# Patient Record
Sex: FEMALE | Race: WHITE | NOT HISPANIC OR LATINO | Employment: FULL TIME | URBAN - METROPOLITAN AREA
[De-identification: names, ages, dates, MRNs, and addresses within clinical notes are randomized per-mention and may not be internally consistent; named-entity substitution may affect disease eponyms.]

---

## 2020-10-26 ENCOUNTER — TRANSFERRED RECORDS (OUTPATIENT)
Dept: HEALTH INFORMATION MANAGEMENT | Facility: CLINIC | Age: 60
End: 2020-10-26

## 2020-10-26 LAB
CHOLESTEROL (EXTERNAL): 205 MG/DL (ref 0–199)
CREATININE (EXTERNAL): 0.86 MG/DL (ref 0.55–1.02)
GFR ESTIMATED (EXTERNAL): >60 ML/MIN/1.73M2
GLUCOSE (EXTERNAL): 99 MG/DL (ref 70–100)
HBA1C MFR BLD: 5.5 %
HDLC SERPL-MCNC: 48 MG/DL
HPV ABSTRACT: NORMAL
LDL CHOLESTEROL CALCULATED (EXTERNAL): 125 MG/DL
PAP-ABSTRACT: NORMAL
POTASSIUM (EXTERNAL): 4.4 MMOL/L (ref 3.5–5.1)
TRIGLYCERIDES (EXTERNAL): 158 MG/DL

## 2021-10-25 ENCOUNTER — OFFICE VISIT (OUTPATIENT)
Dept: FAMILY MEDICINE | Facility: CLINIC | Age: 61
End: 2021-10-25
Payer: COMMERCIAL

## 2021-10-25 VITALS
HEART RATE: 63 BPM | SYSTOLIC BLOOD PRESSURE: 122 MMHG | TEMPERATURE: 98.1 F | DIASTOLIC BLOOD PRESSURE: 80 MMHG | OXYGEN SATURATION: 95 % | RESPIRATION RATE: 12 BRPM

## 2021-10-25 DIAGNOSIS — R05.9 COUGH: Primary | ICD-10-CM

## 2021-10-25 DIAGNOSIS — R09.81 CONGESTION OF PARANASAL SINUS: ICD-10-CM

## 2021-10-25 PROCEDURE — U0003 INFECTIOUS AGENT DETECTION BY NUCLEIC ACID (DNA OR RNA); SEVERE ACUTE RESPIRATORY SYNDROME CORONAVIRUS 2 (SARS-COV-2) (CORONAVIRUS DISEASE [COVID-19]), AMPLIFIED PROBE TECHNIQUE, MAKING USE OF HIGH THROUGHPUT TECHNOLOGIES AS DESCRIBED BY CMS-2020-01-R: HCPCS | Performed by: NURSE PRACTITIONER

## 2021-10-25 PROCEDURE — 99203 OFFICE O/P NEW LOW 30 MIN: CPT | Mod: 95 | Performed by: NURSE PRACTITIONER

## 2021-10-25 PROCEDURE — U0005 INFEC AGEN DETEC AMPLI PROBE: HCPCS | Performed by: NURSE PRACTITIONER

## 2021-10-25 RX ORDER — FLUOXETINE 20 MG/1
20 TABLET, FILM COATED ORAL DAILY
COMMUNITY
End: 2022-02-14

## 2021-10-25 RX ORDER — AZITHROMYCIN 250 MG/1
TABLET, FILM COATED ORAL
Qty: 6 TABLET | Refills: 0 | Status: SHIPPED | OUTPATIENT
Start: 2021-10-25 | End: 2021-10-30

## 2021-10-25 RX ORDER — ATENOLOL 25 MG/1
12.5 TABLET ORAL
COMMUNITY
Start: 2020-10-26 | End: 2022-02-14

## 2021-10-25 NOTE — PROGRESS NOTES
SUBJECTIVE:     Kareen Helton is a 60 year old female, here for a routine health maintenance visit.    Patient was roomed by: VALERIA Quezada CNP    HPI    {PEDS TEXT BY AGE:[id number]}

## 2021-10-25 NOTE — PATIENT INSTRUCTIONS
Thank you for choosing [facility].  You may be receiving an email and/or telephone survey request from [facility] [employer] regarding your visit today.  Please take a few minutes to respond to the survey to let us know how we are doing.      If you have questions or concerns, please contact us via [employer] or you can contact your care team at [phone] option [phone].    Our Clinic hours are:  Monday - Thursday 7am-6pm  Friday 7am-5pm    The [place] outpatient lab hours are:  Monday - Friday 7am-4:30pm    Appointments are required, call [phone]    If you have clinical questions after hours or would like to schedule an appointment,  call the clinic at [phone].

## 2021-10-25 NOTE — PROGRESS NOTES
Assessment & Plan     Cough  R/u covid   - Symptomatic COVID-19 Virus (Coronavirus) by PCR Nose  - azithromycin (ZITHROMAX) 250 MG tablet; Take 2 tablets (500 mg) by mouth daily for 1 day, THEN 1 tablet (250 mg) daily for 4 days.    Congestion of paranasal sinus  R/u covid- if negative will start antibiotics for sinusitis   - Symptomatic COVID-19 Virus (Coronavirus) by PCR Nose  - azithromycin (ZITHROMAX) 250 MG tablet; Take 2 tablets (500 mg) by mouth daily for 1 day, THEN 1 tablet (250 mg) daily for 4 days.      No follow-ups on file.    Chiquis Yan, VALERIA CNP  [facility] FIONA Mathur is a 60 year old who presents for the following health issues;     HPI     Acute Illness  Acute illness concerns: uri, symptoms started 9/18/21, was given pcn 9/30, did get better with meds, once off symptoms started again 10/11/21.  Symptoms started with draining nose/ sinus congestion- was given Amoxacillin from [facility] clinic- felt better. 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 No fevers. She feels fatigued. Low grade headache, + sore throat.   Had negative covid test 9/17/21; 9/20/21 , 9/22/21 ( due to travel to [place])   Onset/Duration: 10/11/21  Symptoms:  Fever: no  Chills/Sweats: no  Headache (location?): YES  Sinus Pressure: YES  Conjunctivitis:  YES  Ear Pain: YES: bilateral  Rhinorrhea: no  Congestion: YES  Sore Throat: YES  Cough: no  Wheeze: no  Decreased Appetite: no  Nausea: no  Vomiting: no  Diarrhea: no  Dysuria/Freq.: no  Dysuria or Hematuria: no  Fatigue/Achiness: YES  Sick/Strep Exposure: no  Therapies tried and outcome: pcn, ibuprofen, tylenol      Review of Systems   Constitutional, HEENT, cardiovascular, pulmonary, GI, , musculoskeletal, neuro, skin, endocrine and psych systems are negative, except as otherwise noted.      Objective    There were no vitals taken for  this visit.  There is no height or weight on file to calculate BMI.  Physical Exam   GENERAL: healthy, alert and no distress  MS: no gross musculoskeletal defects noted, no edema          Telephone visit was 10 minutes

## 2021-10-26 LAB — SARS-COV-2 RNA RESP QL NAA+PROBE: NEGATIVE

## 2021-12-12 ENCOUNTER — HEALTH MAINTENANCE LETTER (OUTPATIENT)
Age: 61
End: 2021-12-12

## 2022-02-01 ENCOUNTER — ANCILLARY PROCEDURE (OUTPATIENT)
Dept: GENERAL RADIOLOGY | Facility: CLINIC | Age: 62
End: 2022-02-01
Attending: NURSE PRACTITIONER
Payer: COMMERCIAL

## 2022-02-01 ENCOUNTER — OFFICE VISIT (OUTPATIENT)
Dept: FAMILY MEDICINE | Facility: CLINIC | Age: 62
End: 2022-02-01
Payer: COMMERCIAL

## 2022-02-01 VITALS
OXYGEN SATURATION: 97 % | WEIGHT: 192 LBS | BODY MASS INDEX: 34.02 KG/M2 | HEART RATE: 75 BPM | RESPIRATION RATE: 14 BRPM | TEMPERATURE: 97.9 F | HEIGHT: 63 IN | SYSTOLIC BLOOD PRESSURE: 128 MMHG | DIASTOLIC BLOOD PRESSURE: 88 MMHG

## 2022-02-01 DIAGNOSIS — R05.8 POST-VIRAL COUGH SYNDROME: ICD-10-CM

## 2022-02-01 DIAGNOSIS — R05.8 COUGH PRESENT FOR GREATER THAN 3 WEEKS: Primary | ICD-10-CM

## 2022-02-01 DIAGNOSIS — R05.8 COUGH PRESENT FOR GREATER THAN 3 WEEKS: ICD-10-CM

## 2022-02-01 PROCEDURE — 71046 X-RAY EXAM CHEST 2 VIEWS: CPT | Performed by: RADIOLOGY

## 2022-02-01 PROCEDURE — 99213 OFFICE O/P EST LOW 20 MIN: CPT | Performed by: NURSE PRACTITIONER

## 2022-02-01 RX ORDER — PREDNISONE 20 MG/1
TABLET ORAL
Qty: 13 TABLET | Refills: 0 | Status: SHIPPED | OUTPATIENT
Start: 2022-02-01 | End: 2022-02-11

## 2022-02-01 RX ORDER — VENLAFAXINE 37.5 MG/1
37.5 TABLET ORAL 2 TIMES DAILY
COMMUNITY
End: 2022-02-14

## 2022-02-01 RX ORDER — ALBUTEROL SULFATE 90 UG/1
1-2 AEROSOL, METERED RESPIRATORY (INHALATION) EVERY 6 HOURS PRN
Qty: 18 G | Refills: 0 | Status: SHIPPED | OUTPATIENT
Start: 2022-02-01 | End: 2023-01-11

## 2022-02-01 ASSESSMENT — MIFFLIN-ST. JEOR: SCORE: 1407.1

## 2022-02-01 NOTE — PATIENT INSTRUCTIONS
Patient Education     Chronic Cough with Uncertain Cause (Adult)  Everyone has had a cough as part of the common cold, flu, or bronchitis. This kind of cough occurs along with an achy feeling, low-grade fever, nasal and sinus congestion, and a scratchy or sore throat. This usually gets better in 2 to 3 weeks. A cough that lasts longer than 3 weeks may be due to other causes. Your healthcare provider may refer to this as a chronic cough.   If your cough does not improve over the next 2 weeks, further testing may be needed. Follow up with your healthcare provider as advised. Cough suppressants may be recommended. Based on your exam today, the exact cause of your cough is not certain. Below are some common causes for persistent cough.   Smoker's cough  Smoker s cough doesn t go away. If you continue to smoke, it only gets worse. The cough is from irritation in the air passages. Talk to your healthcare provider about quitting. Medicines or nicotine-replacement products, like gum or the patch, may make quitting easier.   Postnasal drip  A cough that is worse at night may be due to postnasal drip. Excess mucus in the nose drains from the back of your nose to your throat. This triggers the cough reflex. Postnasal drip may be due to a sinus infection or allergy. Common allergens include dust, tobacco smoke (both inhaled and secondhand smoke), environmental pollutants, pollen, mold, pets, cleaning agents, room deodorizers, and chemical fumes. Over-the-counter antihistamines or decongestants may be helpful for allergies. A sinus infection may requires antibiotic treatment. See your healthcare provider if symptoms continue.     Medicines  Certain prescribed medicines can cause a chronic cough in some people:    ACE inhibitors for high blood pressure. These include benazepril, captopril, enalapril, fosinopril, lisinopril, quinapril, ramipril, and others.    Beta-blockers for high blood pressure and other conditions. These  include propranolol, atenolol, metoprolol, nadolol, and others.  Let your healthcare provider know if you are taking any of these. The chronic cough may mean your medicine needs to be changed.   Asthma  Cough may be the only sign of mild asthma. You may have tests to find out if asthma is causing your cough. You may also take asthma medicine on a trial basis.   Acid reflux (heartburn, GERD)  The esophagus is the tube that carries food from the mouth to the stomach. A valve at its lower end prevents stomach acids from flowing upward. If this valve does not work properly, acid from the stomach enters the esophagus. This may cause a burning pain in the upper abdomen or lower chest, belching, or cough. Symptoms are often worse when lying flat. Avoid eating or drinking before bedtime. Try using extra pillows to raise your upper body, or place 4-inch blocks under the head of your bed. You may try an over-the-counter (OTC) antacid or an acid-blocking medicine such as famotidine, cimetidine, esomeprazole, lansoprazole, or omeprazole. Stronger medicines for this condition can be prescribed by your healthcare provider. Ask your healthcare provider which OTC medicine to use. Depending on your current medicines, some OTC medicines may cause drug interactions and should be avoided.   Follow-up care  Follow up with your healthcare provider, or as advised, if your cough does not improve. Further testing may be needed.   Note: If an X-ray was taken, a specialist will review it. You will be notified of any new findings that may affect your care.   When to seek medical advice  Call your healthcare provider right away if any of these occur:    Mild wheezing or difficulty breathing    Fever of 100.4 F (38 C) or higher, or as directed by your healthcare provider    Unexpected weight loss    Coughing up large amounts of colored sputum or blood-tinged sputum    Night sweats (sheets and pajamas get soaking wet)  Call 911  Call 911 if any  of these occur:     Coughing up blood    Moderate to severe trouble breathing or wheezing  [employer] last reviewed this educational content on 6/1/2018 2000-2021 [employer]. All rights reserved. This information is not intended as a substitute for professional medical care. Always follow your healthcare professional's instructions.

## 2022-02-01 NOTE — PROGRESS NOTES
"  Assessment & Plan     Cough present for greater than 3 weeks  The risks, benefits and treatment options of prescribed medications or other treatments have been discussed with the patient. The patient verbalized their understanding and should call or follow up if no improvement or if they develop further problems.    - predniSONE (DELTASONE) 20 MG tablet  Dispense: 13 tablet; Refill: 0  - albuterol (PROAIR HFA/PROVENTIL HFA/VENTOLIN HFA) 108 (90 Base) MCG/ACT inhaler  Dispense: 18 g; Refill: 0  - XR Chest 2 Views    Post-viral cough syndrome   Discussed this and use of Albuterol and Prednisone for symptoms management.    - predniSONE (DELTASONE) 20 MG tablet  Dispense: 13 tablet; Refill: 0  - albuterol (PROAIR HFA/PROVENTIL HFA/VENTOLIN HFA) 108 (90 Base) MCG/ACT inhaler  Dispense: 18 g; Refill: 0  - XR Chest 2 Views          BMI:   Estimated body mass index is 34.56 kg/m  as calculated from the following:    Height as of this encounter: 1.588 m (5' 2.5\").    Weight as of this encounter: 87.1 kg (192 lb).   Weight management plan: Patient was referred to their PCP to discuss a diet and exercise plan.    Patient Instructions     Patient Education     Chronic Cough with Uncertain Cause (Adult)  Everyone has had a cough as part of the common cold, flu, or bronchitis. This kind of cough occurs along with an achy feeling, low-grade fever, nasal and sinus congestion, and a scratchy or sore throat. This usually gets better in 2 to 3 weeks. A cough that lasts longer than 3 weeks may be due to other causes. Your healthcare provider may refer to this as a chronic cough.   If your cough does not improve over the next 2 weeks, further testing may be needed. Follow up with your healthcare provider as advised. Cough suppressants may be recommended. Based on your exam today, the exact cause of your cough is not certain. Below are some common causes for persistent cough.   Smoker's cough  Smoker s cough doesn t go away. If you " continue to smoke, it only gets worse. The cough is from irritation in the air passages. Talk to your healthcare provider about quitting. Medicines or nicotine-replacement products, like gum or the patch, may make quitting easier.   Postnasal drip  A cough that is worse at night may be due to postnasal drip. Excess mucus in the nose drains from the back of your nose to your throat. This triggers the cough reflex. Postnasal drip may be due to a sinus infection or allergy. Common allergens include dust, tobacco smoke (both inhaled and secondhand smoke), environmental pollutants, pollen, mold, pets, cleaning agents, room deodorizers, and chemical fumes. Over-the-counter antihistamines or decongestants may be helpful for allergies. A sinus infection may requires antibiotic treatment. See your healthcare provider if symptoms continue.     Medicines  Certain prescribed medicines can cause a chronic cough in some people:    ACE inhibitors for high blood pressure. These include benazepril, captopril, enalapril, fosinopril, lisinopril, quinapril, ramipril, and others.    Beta-blockers for high blood pressure and other conditions. These include propranolol, atenolol, metoprolol, nadolol, and others.  Let your healthcare provider know if you are taking any of these. The chronic cough may mean your medicine needs to be changed.   Asthma  Cough may be the only sign of mild asthma. You may have tests to find out if asthma is causing your cough. You may also take asthma medicine on a trial basis.   Acid reflux (heartburn, GERD)  The esophagus is the tube that carries food from the mouth to the stomach. A valve at its lower end prevents stomach acids from flowing upward. If this valve does not work properly, acid from the stomach enters the esophagus. This may cause a burning pain in the upper abdomen or lower chest, belching, or cough. Symptoms are often worse when lying flat. Avoid eating or drinking before bedtime. Try using  extra pillows to raise your upper body, or place 4-inch blocks under the head of your bed. You may try an over-the-counter (OTC) antacid or an acid-blocking medicine such as famotidine, cimetidine, esomeprazole, lansoprazole, or omeprazole. Stronger medicines for this condition can be prescribed by your healthcare provider. Ask your healthcare provider which OTC medicine to use. Depending on your current medicines, some OTC medicines may cause drug interactions and should be avoided.   Follow-up care  Follow up with your healthcare provider, or as advised, if your cough does not improve. Further testing may be needed.   Note: If an X-ray was taken, a specialist will review it. You will be notified of any new findings that may affect your care.   When to seek medical advice  Call your healthcare provider right away if any of these occur:    Mild wheezing or difficulty breathing    Fever of 100.4 F (38 C) or higher, or as directed by your healthcare provider    Unexpected weight loss    Coughing up large amounts of colored sputum or blood-tinged sputum    Night sweats (sheets and pajamas get soaking wet)  Call 911  Call 911 if any of these occur:     Coughing up blood    Moderate to severe trouble breathing or wheezing  [employer] last reviewed this educational content on 6/1/2018 2000-2021 [employer]. All rights reserved. This information is not intended as a substitute for professional medical care. Always follow your healthcare professional's instructions.               Return in about 4 weeks (around 3/1/2022) for Recheck symptoms.    Beckie Wells NP  [facility]    Palmer Mathur is a 61 year old who presents for the following health issues     HPI     Acute Illness  Acute illness concerns: wheezing, was sick in September 2021  Onset/Duration: September was sick with respiratory illness (not COVID) - went on antibiotics after being seen in [facility] clinic helped  "some.  Seen in clinic here in Oct - given Azithromycin and this helped but didn't fully resolve symptoms.    Positive covid 01/24 - illness started 01/16/21 - negative covid 01/26/21  Symptoms:  Fever: no  Chills/Sweats: no  Headache (location?): no  Sinus Pressure: no  Conjunctivitis:  no  Ear Pain: no  Rhinorrhea: no  Congestion: no  Sore Throat: no  Cough: YES-non-productive  Wheeze: YES  Decreased Appetite: no  Nausea: no  Vomiting: no  Diarrhea: no  Dysuria/Freq.: no  Dysuria or Hematuria: no  Fatigue/Achiness: YES  Sick/Strep Exposure: no  Therapies tried and outcome: Antibiotics in Sept and Oct    Denies fevers/chills.  Reports some night sweats with most recent illness.  Some shortness of breath with exertion and activity more recently.    No history of asthma or lung disease.    Not a smoker.    Review of Systems   Constitutional, HEENT, cardiovascular, pulmonary, GI, , musculoskeletal, neuro, skin, endocrine and psych systems are negative, except as otherwise noted.      Objective    /88 (BP Location: Right arm, Patient Position: Sitting, Cuff Size: Adult Large)   Pulse 75   Temp 97.9  F (36.6  C) (Tympanic)   Resp 14   Ht 1.588 m (5' 2.5\")   Wt 87.1 kg (192 lb)   SpO2 97%   Breastfeeding No   BMI 34.56 kg/m    Body mass index is 34.56 kg/m .  Physical Exam   GENERAL: healthy, alert and no distress  NECK: no adenopathy, no asymmetry, masses, or scars and thyroid normal to palpation  RESP: no rales , no rhonchi and expiratory wheezes bibasilar  CV: regular rate and rhythm, normal S1 S2, no S3 or S4, no murmur, click or rub, no peripheral edema and peripheral pulses strong  ABDOMEN: soft, nontender, no hepatosplenomegaly, no masses and bowel sounds normal  MS: no gross musculoskeletal defects noted, no edema      X-ray ordered and independantly visualized in the office today.  Findings significant for: negative.  Official radiology interpretation pending      "

## 2022-02-14 ENCOUNTER — OFFICE VISIT (OUTPATIENT)
Dept: FAMILY MEDICINE | Facility: CLINIC | Age: 62
End: 2022-02-14
Payer: COMMERCIAL

## 2022-02-14 VITALS
WEIGHT: 194.8 LBS | BODY MASS INDEX: 34.52 KG/M2 | DIASTOLIC BLOOD PRESSURE: 82 MMHG | RESPIRATION RATE: 12 BRPM | TEMPERATURE: 98.2 F | HEART RATE: 78 BPM | OXYGEN SATURATION: 93 % | HEIGHT: 63 IN | SYSTOLIC BLOOD PRESSURE: 138 MMHG

## 2022-02-14 DIAGNOSIS — F33.42 RECURRENT MAJOR DEPRESSION IN COMPLETE REMISSION (H): ICD-10-CM

## 2022-02-14 DIAGNOSIS — Z11.4 SCREENING FOR HIV (HUMAN IMMUNODEFICIENCY VIRUS): ICD-10-CM

## 2022-02-14 DIAGNOSIS — Z12.31 ENCOUNTER FOR SCREENING MAMMOGRAM FOR BREAST CANCER: ICD-10-CM

## 2022-02-14 DIAGNOSIS — I10 ESSENTIAL HYPERTENSION: ICD-10-CM

## 2022-02-14 DIAGNOSIS — Z11.59 NEED FOR HEPATITIS C SCREENING TEST: ICD-10-CM

## 2022-02-14 DIAGNOSIS — Z00.00 ROUTINE GENERAL MEDICAL EXAMINATION AT A HEALTH CARE FACILITY: Primary | ICD-10-CM

## 2022-02-14 LAB
ANION GAP SERPL CALCULATED.3IONS-SCNC: 1 MMOL/L (ref 3–14)
BUN SERPL-MCNC: 13 MG/DL (ref 7–30)
CALCIUM SERPL-MCNC: 9.2 MG/DL (ref 8.5–10.1)
CHLORIDE BLD-SCNC: 104 MMOL/L (ref 94–109)
CO2 SERPL-SCNC: 33 MMOL/L (ref 20–32)
CREAT SERPL-MCNC: 0.85 MG/DL (ref 0.52–1.04)
GFR SERPL CREATININE-BSD FRML MDRD: 78 ML/MIN/1.73M2
GLUCOSE BLD-MCNC: 100 MG/DL (ref 70–99)
HCV AB SERPL QL IA: NONREACTIVE
HIV 1+2 AB+HIV1 P24 AG SERPL QL IA: NONREACTIVE
POTASSIUM BLD-SCNC: 4.1 MMOL/L (ref 3.4–5.3)
SODIUM SERPL-SCNC: 138 MMOL/L (ref 133–144)

## 2022-02-14 PROCEDURE — 80048 BASIC METABOLIC PNL TOTAL CA: CPT | Performed by: NURSE PRACTITIONER

## 2022-02-14 PROCEDURE — 36415 COLL VENOUS BLD VENIPUNCTURE: CPT | Performed by: NURSE PRACTITIONER

## 2022-02-14 PROCEDURE — 99214 OFFICE O/P EST MOD 30 MIN: CPT | Mod: 25 | Performed by: NURSE PRACTITIONER

## 2022-02-14 PROCEDURE — 99396 PREV VISIT EST AGE 40-64: CPT | Mod: 25 | Performed by: NURSE PRACTITIONER

## 2022-02-14 PROCEDURE — 90714 TD VACC NO PRESV 7 YRS+ IM: CPT | Performed by: NURSE PRACTITIONER

## 2022-02-14 PROCEDURE — 87389 HIV-1 AG W/HIV-1&-2 AB AG IA: CPT | Performed by: NURSE PRACTITIONER

## 2022-02-14 PROCEDURE — 90471 IMMUNIZATION ADMIN: CPT | Performed by: NURSE PRACTITIONER

## 2022-02-14 PROCEDURE — 86803 HEPATITIS C AB TEST: CPT | Performed by: NURSE PRACTITIONER

## 2022-02-14 RX ORDER — ATENOLOL 25 MG/1
12.5 TABLET ORAL DAILY
Qty: 45 TABLET | Refills: 3 | Status: SHIPPED | OUTPATIENT
Start: 2022-02-14 | End: 2023-01-27

## 2022-02-14 RX ORDER — VENLAFAXINE 37.5 MG/1
37.5 TABLET ORAL DAILY
Qty: 90 TABLET | Refills: 3 | Status: SHIPPED | OUTPATIENT
Start: 2022-02-14 | End: 2023-01-27

## 2022-02-14 ASSESSMENT — ENCOUNTER SYMPTOMS
HEMATOCHEZIA: 0
DIZZINESS: 0
NERVOUS/ANXIOUS: 0
DIARRHEA: 0
COUGH: 0
PARESTHESIAS: 0
HEADACHES: 0
MYALGIAS: 0
EYE PAIN: 0
PALPITATIONS: 0
WEAKNESS: 0
ARTHRALGIAS: 0
CHILLS: 0
NAUSEA: 0
FREQUENCY: 0
DYSURIA: 0
HEARTBURN: 0
FEVER: 0
JOINT SWELLING: 0
BREAST MASS: 0
SORE THROAT: 0
HEMATURIA: 0
SHORTNESS OF BREATH: 0
ABDOMINAL PAIN: 0
CONSTIPATION: 0

## 2022-02-14 ASSESSMENT — PATIENT HEALTH QUESTIONNAIRE - PHQ9: SUM OF ALL RESPONSES TO PHQ QUESTIONS 1-9: 0

## 2022-02-14 ASSESSMENT — MIFFLIN-ST. JEOR: SCORE: 1419.8

## 2022-02-14 NOTE — LETTER
February 14, 2022      Kareen Helton  [address] [address] [place] [address]        Dear ,    We are writing to inform you of your test results.    Your labs are normal..       Resulted Orders   Basic metabolic panel  (Ca, Cl, CO2, Creat, Gluc, K, Na, BUN)   Result Value Ref Range    Sodium 138 133 - 144 mmol/L    Potassium 4.1 3.4 - 5.3 mmol/L    Chloride 104 94 - 109 mmol/L    Carbon Dioxide (CO2) 33 (H) 20 - 32 mmol/L    Anion Gap 1 (L) 3 - 14 mmol/L    Urea Nitrogen 13 7 - 30 mg/dL    Creatinine 0.85 0.52 - 1.04 mg/dL    Calcium 9.2 8.5 - 10.1 mg/dL    Glucose 100 (H) 70 - 99 mg/dL    GFR Estimate 78 >60 mL/min/1.73m2      Comment:      Effective December 21, 2021 eGFRcr in adults is calculated using the 2021 CKD-EPI creatinine equation which includes age and gender (Jeff et al., NEJM, DOI: 10.1056/SADPtf5740631)       If you have any questions or concerns, please call the clinic at the number listed above.       Sincerely,      VALERIA Quezada CNP

## 2022-02-14 NOTE — PROGRESS NOTES
SUBJECTIVE:   CC: Kareen Helton is an 61 year old woman who presents for preventive health visit.       Patient has been advised of split billing requirements and indicates understanding: Yes  Healthy Habits:     Getting at least 3 servings of Calcium per day:  Yes    Bi-annual eye exam:  Yes    Dental care twice a year:  Yes    Sleep apnea or symptoms of sleep apnea:  None    Diet:  Regular (no restrictions)    Frequency of exercise:  None    Taking medications regularly:  Yes    Medication side effects:  None    PHQ-2 Total Score: 0    Additional concerns today:  No  Imm/Inj  Pertinent negatives include no abdominal pain, arthralgias, chest pain, chills, congestion, coughing, fever, headaches, joint swelling, myalgias, nausea, rash, sore throat or weakness.           Hypertension Follow-up      Do you check your blood pressure regularly outside of the clinic? No     Are you following a low salt diet? No    Are your blood pressures ever more than 140 on the top number (systolic) OR more   than 90 on the bottom number (diastolic), for example 140/90? No   BP Readings from Last 3 Encounters:   02/14/22 138/82   02/01/22 128/88   10/25/21 122/80       Depression Followup    How are you doing with your depression since your last visit? Improved ALOT    Are you having other symptoms that might be associated with depression? No    Have you had a significant life event?  No     Are you feeling anxious or having panic attacks?   No    Do you have any concerns with your use of alcohol or other drugs? No    Social History     Tobacco Use     Smoking status: Former Smoker     Types: Cigarettes     Smokeless tobacco: Never Used   Vaping Use     Vaping Use: Never used   Substance Use Topics     Alcohol use: Yes     Comment: 5 per week     Drug use: Never     PHQ 2/14/2022   PHQ-9 Total Score 0   Q9: Thoughts of better off dead/self-harm past 2 weeks Not at all     No flowsheet data found.  Last PHQ-9 2/14/2022   1.  Little  interest or pleasure in doing things 0   2.  Feeling down, depressed, or hopeless 0   3.  Trouble falling or staying asleep, or sleeping too much 0   4.  Feeling tired or having little energy 0   5.  Poor appetite or overeating 0   6.  Feeling bad about yourself 0   7.  Trouble concentrating 0   8.  Moving slowly or restless 0   Q9: Thoughts of better off dead/self-harm past 2 weeks 0   PHQ-9 Total Score 0   Difficulty at work, home, or with people Not difficult at all       Suicide Assessment Five-step Evaluation and Treatment (SAFE-T)      Today's PHQ-2 Score:   PHQ-2 ( 1999 [employer]izer) 2/14/2022   Q1: Little interest or pleasure in doing things 0   Q2: Feeling down, depressed or hopeless 0   PHQ-2 Score 0   Q1: Little interest or pleasure in doing things Not at all   Q2: Feeling down, depressed or hopeless Not at all   PHQ-2 Score 0       Abuse: Current or Past (Physical, Sexual or Emotional) - No  Do you feel safe in your environment? Yes    Have you ever done Advance Care Planning? (For example, a Health Directive, POLST, or a discussion with a medical provider or your loved ones about your wishes): Yes, patient states has an Advance Care Planning document and will bring a copy to the clinic.    Social History     Tobacco Use     Smoking status: Former Smoker     Types: Cigarettes     Smokeless tobacco: Never Used   Substance Use Topics     Alcohol use: Yes     Comment: 5 per week     If you drink alcohol do you typically have >3 drinks per day or >7 drinks per week? No    Alcohol Use 2/14/2022   Prescreen: >3 drinks/day or >7 drinks/week? No   Prescreen: >3 drinks/day or >7 drinks/week? -   No flowsheet data found.    Reviewed orders with patient.  Reviewed health maintenance and updated orders accordingly - Yes  BP Readings from Last 3 Encounters:   02/14/22 138/82   02/01/22 128/88   10/25/21 122/80    Wt Readings from Last 3 Encounters:   02/14/22 88.4 kg (194 lb 12.8 oz)   02/01/22 87.1 kg (192 lb)                     Breast Cancer Screening:    FHS-7:   Breast CA Risk Assessment (FHS-7) 2/14/2022   Did any of your first-degree relatives have breast or ovarian cancer? No   Did any of your relatives have bilateral breast cancer? No   Did any man in your family have breast cancer? No   Did any woman in your family have breast and ovarian cancer? No   Did any woman in your family have breast cancer before age 50 y? No   Do you have 2 or more relatives with breast and/or ovarian cancer? No   Do you have 2 or more relatives with breast and/or bowel cancer? No     click delete button to remove this line now  Mammogram Screening: Recommended mammography every 1-2 years with patient discussion and risk factor consideration  Pertinent mammograms are reviewed under the imaging tab.    History of abnormal Pap smear: Status post benign hysterectomy. Health Maintenance and Surgical History updated.     Reviewed and updated as needed this visit by clinical staff  Tobacco  Allergies  Meds   Med Hx  Surg Hx  Fam Hx  Soc Hx       Reviewed and updated as needed this visit by Provider                   Review of Systems   Constitutional: Negative for chills and fever.   HENT: Negative for congestion, ear pain, hearing loss and sore throat.    Eyes: Negative for pain and visual disturbance.   Respiratory: Negative for cough and shortness of breath.    Cardiovascular: Negative for chest pain, palpitations and peripheral edema.   Gastrointestinal: Negative for abdominal pain, constipation, diarrhea, heartburn, hematochezia and nausea.   Breasts:  Negative for tenderness, breast mass and discharge.   Genitourinary: Negative for dysuria, frequency, genital sores, hematuria, pelvic pain, urgency, vaginal bleeding and vaginal discharge.   Musculoskeletal: Negative for arthralgias, joint swelling and myalgias.   Skin: Negative for rash.   Neurological: Negative for dizziness, weakness, headaches and paresthesias.   Psychiatric/Behavioral:  "Negative for mood changes. The patient is not nervous/anxious.      CONSTITUTIONAL: NEGATIVE for fever, chills, change in weight  INTEGUMENTARY/SKIN: NEGATIVE for worrisome rashes, moles or lesions  EYES: NEGATIVE for vision changes or irritation  ENT: NEGATIVE for ear, mouth and throat problems  RESP: NEGATIVE for significant cough or SOB  BREAST: patient declined  CV: NEGATIVE for chest pain, palpitations or peripheral edema  GI: NEGATIVE for nausea, abdominal pain, heartburn, or change in bowel habits  : NEGATIVE for unusual urinary or vaginal symptoms. No vaginal bleeding.  MUSCULOSKELETAL: NEGATIVE for significant arthralgias or myalgia  NEURO: NEGATIVE for weakness, dizziness or paresthesias  PSYCHIATRIC: NEGATIVE for changes in mood or affect      OBJECTIVE:   /82 (BP Location: Left arm, Patient Position: Chair, Cuff Size: Adult Regular)   Pulse 78   Temp 98.2  F (36.8  C) (Tympanic)   Resp 12   Ht 1.588 m (5' 2.5\")   Wt 88.4 kg (194 lb 12.8 oz)   SpO2 93%   BMI 35.06 kg/m    Physical Exam  GENERAL APPEARANCE: healthy, alert and no distress  EYES: Eyes grossly normal to inspection, PERRL and conjunctivae and sclerae normal  HENT: ear canals and TM's normal, nose and mouth without ulcers or lesions, oropharynx clear and oral mucous membranes moist  NECK: no adenopathy, no asymmetry, masses, or scars and thyroid normal to palpation  RESP: lungs clear to auscultation - no rales, rhonchi or wheezes  BREAST: patient declined  CV: regular rate and rhythm, normal S1 S2, no S3 or S4, no murmur, click or rub, no peripheral edema and peripheral pulses strong  ABDOMEN: soft, nontender, no hepatosplenomegaly, no masses and bowel sounds normal  MS: no musculoskeletal defects are noted and gait is age appropriate without ataxia  SKIN: no suspicious lesions or rashes  NEURO: Normal strength and tone, sensory exam grossly normal, mentation intact and speech normal  PSYCH: mentation appears normal and affect " "normal/bright    Diagnostic Test Results:  Labs reviewed in Epic    ASSESSMENT/PLAN:   (Z00.00) Routine general medical examination at a health care facility  (primary encounter diagnosis)  Comment:   Plan:     (Z11.4) Screening for HIV (human immunodeficiency virus)  Comment:   Plan: HIV Antigen Antibody Combo            (Z11.59) Need for hepatitis C screening test  Comment:   Plan: Hepatitis C Screen Reflex to HCV RNA Quant and         Genotype            (I10) Essential hypertension  Comment: controlled  Plan:Refilled atenolol (TENORMIN) 25 MG tablet, Basic         metabolic panel  (Ca, Cl, CO2, Creat, Gluc, K,         Na, BUN)            (F33.42) Recurrent major depression in complete remission (H)  Comment: well controlled  Plan: Refilled venlafaxine (EFFEXOR) 37.5 MG tablet            (Z12.31) Encounter for screening mammogram for breast cancer  Comment:   Plan: *MA Screening Digital Bilateral              Patient has been advised of split billing requirements and indicates understanding: Yes    COUNSELING:  Reviewed preventive health counseling, as reflected in patient instructions       Regular exercise       Healthy diet/nutrition       Osteoporosis prevention/bone health       Colorectal Cancer Screening       Consider Hep C screening for all patients one time for ages 18-79 years       HIV screeninx in teen years, 1x in adult years, and at intervals if high risk    Estimated body mass index is 35.06 kg/m  as calculated from the following:    Height as of this encounter: 1.588 m (5' 2.5\").    Weight as of this encounter: 88.4 kg (194 lb 12.8 oz).    Weight management plan: Discussed healthy diet and exercise guidelines    She reports that she has quit smoking. Her smoking use included cigarettes. She has never used smokeless tobacco.      Counseling Resources:  ATP IV Guidelines  Pooled Cohorts Equation Calculator  Breast Cancer Risk Calculator  BRCA-Related Cancer Risk Assessment: FHS-7 Tool  FRAX " Risk Assessment  ICSI Preventive Guidelines  Dietary Guidelines for Americans, 2010  USDA's MyPlate  ASA Prophylaxis  Lung CA Screening    VALERIA Quezada CNP  [facility]

## 2022-02-14 NOTE — PATIENT INSTRUCTIONS
Thank you for choosing [facility].  You may be receiving an email and/or telephone survey request from [facility] Customer Experience regarding your visit today.  Please take a few minutes to respond to the survey to let us know how we are doing.      If you have questions or concerns, please contact us via [employer] or you can contact your care team at [phone] option [phone].    Our Clinic hours are:  Monday - Thursday 7am-6pm  Friday 7am-5pm    The [place] outpatient lab hours are:  Monday - Friday 7am-4:30pm    Appointments are required, call [phone]    If you have clinical questions after hours or would like to schedule an appointment,  call the clinic at [phone].

## 2022-07-25 ENCOUNTER — ANCILLARY PROCEDURE (OUTPATIENT)
Dept: MAMMOGRAPHY | Facility: CLINIC | Age: 62
End: 2022-07-25
Attending: NURSE PRACTITIONER
Payer: COMMERCIAL

## 2022-07-25 ENCOUNTER — TELEPHONE (OUTPATIENT)
Dept: FAMILY MEDICINE | Facility: CLINIC | Age: 62
End: 2022-07-25

## 2022-07-25 ENCOUNTER — MYC MEDICAL ADVICE (OUTPATIENT)
Dept: FAMILY MEDICINE | Facility: CLINIC | Age: 62
End: 2022-07-25

## 2022-07-25 DIAGNOSIS — Z12.31 ENCOUNTER FOR SCREENING MAMMOGRAM FOR BREAST CANCER: ICD-10-CM

## 2022-07-25 PROCEDURE — 77063 BREAST TOMOSYNTHESIS BI: CPT | Mod: TC | Performed by: RADIOLOGY

## 2022-07-25 PROCEDURE — 77067 SCR MAMMO BI INCL CAD: CPT | Mod: TC | Performed by: RADIOLOGY

## 2022-07-25 ASSESSMENT — ANXIETY QUESTIONNAIRES
5. BEING SO RESTLESS THAT IT IS HARD TO SIT STILL: NOT AT ALL
GAD7 TOTAL SCORE: 0
2. NOT BEING ABLE TO STOP OR CONTROL WORRYING: NOT AT ALL
7. FEELING AFRAID AS IF SOMETHING AWFUL MIGHT HAPPEN: NOT AT ALL
GAD7 TOTAL SCORE: 0
3. WORRYING TOO MUCH ABOUT DIFFERENT THINGS: NOT AT ALL
4. TROUBLE RELAXING: NOT AT ALL
7. FEELING AFRAID AS IF SOMETHING AWFUL MIGHT HAPPEN: NOT AT ALL
6. BECOMING EASILY ANNOYED OR IRRITABLE: NOT AT ALL
GAD7 TOTAL SCORE: 0
1. FEELING NERVOUS, ANXIOUS, OR ON EDGE: NOT AT ALL

## 2022-07-25 ASSESSMENT — PATIENT HEALTH QUESTIONNAIRE - PHQ9
SUM OF ALL RESPONSES TO PHQ QUESTIONS 1-9: 0
SUM OF ALL RESPONSES TO PHQ QUESTIONS 1-9: 0

## 2022-07-25 NOTE — TELEPHONE ENCOUNTER
Patient Quality Outreach    Patient is due for the following:   Colon Cancer Screening -  FIT, Colonoscopy, and Cologuard  Depression  -  PHQ-9 Needed    NEXT STEPS:   No follow up needed at this time.    Type of outreach:    Sent [employer] message.      Questions for provider review:    None     PHILLIP Burgos LPN

## 2022-07-25 NOTE — TELEPHONE ENCOUNTER
Patient Quality Outreach    Patient is due for the following:   Depression  -  PHQ-9 Needed    NEXT STEPS:   phq/gad7    Type of outreach:    Sent [facility] message.      Questions for provider review:    Patient completed phq/gad7 sent back thru my chart. Scores for both were 0. Updated chart.      PHQ-9 ([employer]) 7/25/2022   1.  Little interest or pleasure in doing things 0   2.  Feeling down, depressed, or hopeless 0   3.  Trouble falling or staying asleep, or sleeping too much 0   4.  Feeling tired or having little energy 0   5.  Poor appetite or overeating 0   6.  Feeling bad about yourself 0   7.  Trouble concentrating 0   8.  Moving slowly or restless 0   9.  Suicidal or self-harm thoughts 0   PHQ-9 Total Score 0   Difficulty at work, home, or with people    1.  Little interest or pleasure in doing things Not at all   2.  Feeling down, depressed, or hopeless Not at all   3.  Trouble falling or staying asleep, or sleeping too much Not at all   4.  Feeling tired or having little energy Not at all   5.  Poor appetite or overeating Not at all   6.  Feeling bad about yourself Not at all   7.  Trouble concentrating Not at all   8.  Moving slowly or restless Not at all   9.  Suicidal or self-harm thoughts Not at all   PHQ-9 via [facility] TOTAL SCORE-----> 0     DEREK-7   [employer], 2002; Used with Permission) 7/25/2022   1. Feeling nervous, anxious, or on edge Not at all   2. Not being able to stop or control worrying Not at all   3. Worrying too much about different things Not at all   4. Trouble relaxing Not at all   5. Being so restless that it is hard to sit still Not at all   6. Becoming easily annoyed or irritable Not at all   7. Feeling afraid, as if something awful might happen Not at all   DEREK 7 TOTAL SCORE 0 (minimal anxiety)   1. Feeling nervous, anxious, or on edge 0   2. Not being able to stop or control worrying 0   3. Worrying too much about different things 0   4. Trouble relaxing 0   5. Being so restless  that it is hard to sit still 0   6. Becoming easily annoyed or irritable 0   7. Feeling afraid, as if something awful might happen 0   DEREK-7 Total Score 0     Agustin Thao, CMA

## 2022-10-03 ENCOUNTER — HEALTH MAINTENANCE LETTER (OUTPATIENT)
Age: 62
End: 2022-10-03

## 2022-10-31 ENCOUNTER — HOSPITAL ENCOUNTER (EMERGENCY)
Facility: CLINIC | Age: 62
Discharge: HOME | End: 2022-10-31
Attending: EMERGENCY MEDICINE | Admitting: EMERGENCY MEDICINE
Payer: COMMERCIAL

## 2022-10-31 VITALS
TEMPERATURE: 98 F | BODY MASS INDEX: 33.13 KG/M2 | HEART RATE: 84 BPM | SYSTOLIC BLOOD PRESSURE: 153 MMHG | HEIGHT: 62 IN | OXYGEN SATURATION: 95 % | RESPIRATION RATE: 18 BRPM | WEIGHT: 180 LBS | DIASTOLIC BLOOD PRESSURE: 93 MMHG

## 2022-10-31 DIAGNOSIS — R42 VERTIGO: ICD-10-CM

## 2022-10-31 PROCEDURE — 250N000012 HC RX MED GY IP 250 OP 636 PS 637: Performed by: EMERGENCY MEDICINE

## 2022-10-31 PROCEDURE — 250N000013 HC RX MED GY IP 250 OP 250 PS 637: Performed by: EMERGENCY MEDICINE

## 2022-10-31 PROCEDURE — 250N000011 HC RX IP 250 OP 636: Performed by: EMERGENCY MEDICINE

## 2022-10-31 PROCEDURE — 99284 EMERGENCY DEPT VISIT MOD MDM: CPT | Performed by: EMERGENCY MEDICINE

## 2022-10-31 PROCEDURE — 96374 THER/PROPH/DIAG INJ IV PUSH: CPT

## 2022-10-31 PROCEDURE — 99284 EMERGENCY DEPT VISIT MOD MDM: CPT | Mod: 25

## 2022-10-31 PROCEDURE — 96375 TX/PRO/DX INJ NEW DRUG ADDON: CPT

## 2022-10-31 RX ORDER — MECLIZINE HYDROCHLORIDE 25 MG/1
25 TABLET ORAL ONCE
Status: COMPLETED | OUTPATIENT
Start: 2022-10-31 | End: 2022-10-31

## 2022-10-31 RX ORDER — PREDNISONE 20 MG/1
20 TABLET ORAL ONCE
Status: COMPLETED | OUTPATIENT
Start: 2022-10-31 | End: 2022-10-31

## 2022-10-31 RX ORDER — ACETAMINOPHEN 500 MG
1000 TABLET ORAL ONCE
Status: COMPLETED | OUTPATIENT
Start: 2022-10-31 | End: 2022-10-31

## 2022-10-31 RX ORDER — LORAZEPAM 0.5 MG/1
0.5 TABLET ORAL ONCE
Status: COMPLETED | OUTPATIENT
Start: 2022-10-31 | End: 2022-10-31

## 2022-10-31 RX ORDER — PREDNISONE 20 MG/1
20 TABLET ORAL 2 TIMES DAILY PRN
Qty: 20 TABLET | Refills: 0 | Status: SHIPPED | OUTPATIENT
Start: 2022-10-31 | End: 2023-01-11

## 2022-10-31 RX ORDER — KETOROLAC TROMETHAMINE 30 MG/ML
30 INJECTION, SOLUTION INTRAMUSCULAR; INTRAVENOUS ONCE
Status: COMPLETED | OUTPATIENT
Start: 2022-10-31 | End: 2022-10-31

## 2022-10-31 RX ORDER — MECLIZINE HYDROCHLORIDE 25 MG/1
25 TABLET ORAL 3 TIMES DAILY PRN
Qty: 30 TABLET | Refills: 1 | Status: SHIPPED | OUTPATIENT
Start: 2022-10-31 | End: 2023-01-11

## 2022-10-31 RX ADMIN — PREDNISONE 20 MG: 20 TABLET ORAL at 19:38

## 2022-10-31 RX ADMIN — ACETAMINOPHEN 1000 MG: 500 TABLET, FILM COATED ORAL at 18:57

## 2022-10-31 RX ADMIN — PROCHLORPERAZINE EDISYLATE 10 MG: 5 INJECTION INTRAMUSCULAR; INTRAVENOUS at 19:00

## 2022-10-31 RX ADMIN — KETOROLAC TROMETHAMINE 30 MG: 30 INJECTION, SOLUTION INTRAMUSCULAR at 18:58

## 2022-10-31 RX ADMIN — LORAZEPAM 0.5 MG: 0.5 TABLET ORAL at 18:57

## 2022-10-31 RX ADMIN — MECLIZINE HYDROCHLORIDE 25 MG: 25 TABLET ORAL at 18:57

## 2022-10-31 ASSESSMENT — ENCOUNTER SYMPTOMS
FEVER: 0
NECK STIFFNESS: 0
NAUSEA: 1
HEADACHES: 1
VOMITING: 1

## 2022-10-31 ASSESSMENT — ACTIVITIES OF DAILY LIVING (ADL)
ADLS_ACUITY_SCORE: 33
ADLS_ACUITY_SCORE: 35

## 2022-10-31 NOTE — ED PROVIDER NOTES
History     Chief Complaint   Patient presents with     Dizziness     HPI  Kareen Helton is a 61 year old female who presents with vertigo.  This is an ongoing issue.  The patient has a history of vertigo and had a last week.  It was getting somewhat better but yesterday when she rolled over in bed it got much worse.  She feels like the room is spinning.  She tried the Epley maneuver at home but it did not resolve her symptoms.  It has helped in the past.  She also developed nausea and vomiting and a very serious headache.  It was not maximal at onset. The headache started after the vertigo got very bad.  She has no fevers, no neck stiffness, no carbon oxide poisoning of which she is aware.  Nobody else in the house has a headache. No rashes.  She is able to walk and walked yesterday.  Says her ears feel funny.      Allergies:  No Known Allergies    Problem List:    There are no problems to display for this patient.       Past Medical History:    No past medical history on file.    Past Surgical History:    No past surgical history on file.    Family History:    Family History   Problem Relation Age of Onset     Hypertension Mother      Hyperlipidemia Mother      Emphysema Mother      Colorectal Cancer Father      Hypertension Maternal Grandmother      Hyperlipidemia Maternal Grandmother      Cerebrovascular Disease Maternal Grandmother      Emphysema Paternal Grandfather      Hypertension Brother      Hypertension Sister      Hyperlipidemia Sister      Asthma Son        Social History:  Marital Status:  Single [1]  Social History     Tobacco Use     Smoking status: Former     Types: Cigarettes     Smokeless tobacco: Never   Vaping Use     Vaping Use: Never used   Substance Use Topics     Alcohol use: Yes     Comment: 5 per week     Drug use: Never        Medications:    meclizine (ANTIVERT) 25 MG tablet  predniSONE (DELTASONE) 20 MG tablet  albuterol (PROAIR HFA/PROVENTIL HFA/VENTOLIN HFA) 108 (90 Base) MCG/ACT  "inhaler  atenolol (TENORMIN) 25 MG tablet  venlafaxine (EFFEXOR) 37.5 MG tablet          Review of Systems   Constitutional: Negative for fever.   Gastrointestinal: Positive for nausea and vomiting.   Musculoskeletal: Negative for gait problem and neck stiffness.   Skin: Negative for rash.   Neurological: Positive for headaches.        Vertigo   All other systems reviewed and are negative.      Physical Exam   BP: (!) 144/87  Pulse: 86  Temp: 98.1  F (36.7  C)  Resp: 16  Height: 157.5 cm (5' 2\")  Weight: 81.6 kg (180 lb)  SpO2: 98 %      Physical Exam  Vitals reviewed.   Constitutional:       General: She is not in acute distress.     Appearance: She is not toxic-appearing.   HENT:      Head: Normocephalic.      Right Ear: Tympanic membrane and external ear normal.      Left Ear: Tympanic membrane and external ear normal.   Eyes:      General:         Right eye: No discharge.         Left eye: No discharge.      Comments: Left beating nystagmus.  Improves with fixation.   No rotary or vertical nystagmus   Cardiovascular:      Rate and Rhythm: Normal rate.      Pulses: Normal pulses.   Pulmonary:      Effort: No respiratory distress.   Abdominal:      General: There is no distension.   Musculoskeletal:      Cervical back: No rigidity.   Skin:     Capillary Refill: Capillary refill takes less than 2 seconds.   Neurological:      Cranial Nerves: No cranial nerve deficit.      Motor: No weakness.      Coordination: Coordination normal.      Gait: Gait normal.      Comments: Positive (reassuring) HiNTS  Negative test of skew   Psychiatric:      Comments: Very nice         ED Course              ED Course as of 11/06/22 0151   Mon Oct 31, 2022   1934 I performed the Epley maneuver in the room.  The patient reported a great deal of improvement in her vertigo after this.  Her headache is gone after headache treatment.  I am going to prescribe meclizine for vertigo.  She says that her right ear feels fine now but her left " ear feels full.  I did examine both ears the right ear is unremarkable.  The left ear does not have an effusion or any erythema around the TM but slightly full.  I wonder if she has a virus and that is causing some eustachian tube dysfunction.  I am going to give her prednisone here for that and some for home.  She really wants to be discharged at this time.  Her symptoms are much better.  I gave her ER precautions and she expressed understanding of these.  She is very appreciative.  She is going to keep doing the Epley at home.  I think it safe to discharge her at this time.     Procedures                  No results found for this or any previous visit (from the past 24 hour(s)).    Medications   LORazepam (ATIVAN) tablet 0.5 mg (0.5 mg Oral Given 10/31/22 1857)   meclizine (ANTIVERT) tablet 25 mg (25 mg Oral Given 10/31/22 1857)   ketorolac (TORADOL) injection 30 mg (30 mg Intravenous Given 10/31/22 1858)   acetaminophen (TYLENOL) tablet 1,000 mg (1,000 mg Oral Given 10/31/22 1857)   prochlorperazine (COMPAZINE) injection 10 mg (10 mg Intravenous Given 10/31/22 1900)   predniSONE (DELTASONE) tablet 20 mg (20 mg Oral Given 10/31/22 1938)       Assessments & Plan (with Medical Decision Making)     This is a very classic case of peripheral vertigo.  I have a very low suspicion that there is a central lesion based on the exam and history.  The history of rolling over bed and the symptoms worsening is very typical for peripheral vertigo.  She has left beating nystagmus only which is reassuring.  I am going to treat her headache, which I think is related to her nausea and vertigo and not a concerning headache that would suggest a bleed, central infection, carbon oxide poisoning, or temporal arteritis.  I am going to treat her symptomatically for headache and for vertigo, then going to perform the Epley maneuver in the room.    I have reviewed the nursing notes.    I have reviewed the findings, diagnosis, plan and need  for follow up with the patient.  This note was in part created using dictation software in an effort to speed and improve patient care; any typos should be read with the frequent shortcomings of this technology in mind.      Discharge Medication List as of 10/31/2022  7:43 PM      START taking these medications    Details   meclizine (ANTIVERT) 25 MG tablet Take 1 tablet (25 mg) by mouth 3 times daily as needed for dizziness, Disp-30 tablet, R-1, E-Prescribe      predniSONE (DELTASONE) 20 MG tablet Take 1 tablet (20 mg) by mouth 2 times daily as needed (ear fullness), Disp-20 tablet, R-0, E-Prescribe             Final diagnoses:   Vertigo       10/31/2022   [facility] EMERGENCY DEPT     Slick Elder MD  11/06/22 0151       Slick Elder MD  11/06/22 0151

## 2022-10-31 NOTE — ED NOTES
"Pt reports \"vertigo\" sx since yesterday. Reports having vertigo in the past. Describes the room as spinning and feeling like shes \"falling down a tunnel\". Pt reports she has had emesis after feeling dizzy.   Writer and provider spoke with pt about treatment and diagnostic options in ED vs. UC. Pt agreed to be evaluated in the ED.    "

## 2022-10-31 NOTE — ED TRIAGE NOTES
Pt presents to the ER with c/o vertigo.  States she has been doing the Eply and Semont  Maneuvers at home with some luck but s/s are worse today. Vertigo is noted on R side.  She also has a HA. Both ears hurt. N/v today also.      Triage Assessment     Row Name 10/31/22 5009       Triage Assessment (Adult)    Airway WDL WDL       Respiratory WDL    Respiratory WDL WDL       Cognitive/Neuro/Behavioral WDL    Cognitive/Neuro/Behavioral WDL WDL

## 2022-11-01 NOTE — DISCHARGE INSTRUCTIONS
You are seen today for headache and vertigo.  Seems like the Epley and the medications here helped.  I did prescribe some medication for dizziness vertigo and some medication for ear fullness.    Please keep doing the Epley at home.  I think that your best bet.  Keep in mind that your lesion is on your left so you should start it facing to the left.    You take 1000 mg of Tylenol every 8 hours for headache.  You can also take 800 mg of ibuprofen every 8 hours.    Thanks for your patience, and I hope you feel better soon.

## 2022-11-25 ENCOUNTER — HOSPITAL ENCOUNTER (EMERGENCY)
Facility: CLINIC | Age: 62
Discharge: HOME | End: 2022-11-25
Attending: NURSE PRACTITIONER | Admitting: NURSE PRACTITIONER
Payer: COMMERCIAL

## 2022-11-25 VITALS
OXYGEN SATURATION: 99 % | HEART RATE: 78 BPM | RESPIRATION RATE: 10 BRPM | SYSTOLIC BLOOD PRESSURE: 135 MMHG | TEMPERATURE: 98.1 F | DIASTOLIC BLOOD PRESSURE: 72 MMHG

## 2022-11-25 DIAGNOSIS — H69.90 ETD (EUSTACHIAN TUBE DYSFUNCTION): ICD-10-CM

## 2022-11-25 PROCEDURE — G0463 HOSPITAL OUTPT CLINIC VISIT: HCPCS | Performed by: NURSE PRACTITIONER

## 2022-11-25 PROCEDURE — 99213 OFFICE O/P EST LOW 20 MIN: CPT | Performed by: NURSE PRACTITIONER

## 2022-11-25 ASSESSMENT — ACTIVITIES OF DAILY LIVING (ADL): ADLS_ACUITY_SCORE: 35

## 2022-11-25 NOTE — ED PROVIDER NOTES
"  History     Chief Complaint   Patient presents with     Otalgia     Pt thinks \" I have cotton stuck in my left ear from my Q tip\"     HPI    HPI:   Kareen Helton is a 62 year old female brought by [facility]  to the ED complaining of left pain that started 2 week(s) ago.  There is associated plugged sensation on left and congestion.  There is not associated sore throat, swollen glands, fever, irritability, vomiting, diarrhea, headache, nausea and abdominal discomfort.  She has tried nothing without relief of symptoms.  Temperature not measured at home.  Patient reports being seen in the emergency room 2 weeks ago and diagnosed with vertigo.  Allergies:  No Known Allergies    Problem List:    There are no problems to display for this patient.       Past Medical History:    History reviewed. No pertinent past medical history.    Past Surgical History:    History reviewed. No pertinent surgical history.    Family History:    Family History   Problem Relation Age of Onset     Hypertension Mother      Hyperlipidemia Mother      Emphysema Mother      Colorectal Cancer Father      Hypertension Maternal Grandmother      Hyperlipidemia Maternal Grandmother      Cerebrovascular Disease Maternal Grandmother      Emphysema Paternal Grandfather      Hypertension Brother      Hypertension Sister      Hyperlipidemia Sister      Asthma Son        Social History:  Marital Status:  Single [1]  Social History     Tobacco Use     Smoking status: Former     Types: Cigarettes     Smokeless tobacco: Never   Vaping Use     Vaping Use: Never used   Substance Use Topics     Alcohol use: Yes     Comment: 5 per week     Drug use: Never        Medications:    albuterol (PROAIR HFA/PROVENTIL HFA/VENTOLIN HFA) 108 (90 Base) MCG/ACT inhaler  atenolol (TENORMIN) 25 MG tablet  meclizine (ANTIVERT) 25 MG tablet  predniSONE (DELTASONE) 20 MG tablet  venlafaxine (EFFEXOR) 37.5 MG tablet          Review of Systems  As mentioned above in the history present " illness. All other systems were reviewed and are negative.    Physical Exam   BP: 135/72  Pulse: 78  Temp: 98.1  F (36.7  C)  Resp: 10  SpO2: 99 %      Physical Exam  General: healthy, alert, cooperative and smiling  Head: Normocephalic. No masses, lesions, tenderness or abnormalities  Eyes: conjunctivae not injected /corneas clear. PERRL, EOM's intact.  Ears: normal  Nose: clear rhinorrhea, mucosal edema and noted on left nares  Mouth/Throat: normal and exudates absent, uvula midline, tonsillar hypertrophy absent, muffled voice absent, trismus absent  Neck: normal, supple and no adenopathy  Lungs: chest clear to IPPA, no tachypnea, retractions or cyanosis and S1, S2 normal, no murmur, no gallop, rate regular    ED Course                 Procedures    No results found for this or any previous visit (from the past 24 hour(s)).    Medications - No data to display    Assessments & Plan (with Medical Decision Making)     I have reviewed the nursing notes.    I have reviewed the findings, diagnosis, plan and need for follow up with the patient.  Assessment:  Eustachian tube dysfunction      Plan:   Flonase 2 sprays each nostril daily x2 weeks, cetirizine 10 mg by mouth twice daily for 2 weeks  Return to the ER with increased pain, fevers or any other concerns.    Condition on disposition: Stable  New Prescriptions    No medications on file       Final diagnoses:   ETD (eustachian tube dysfunction)       11/25/2022   [facility] EMERGENCY DEPT     Willem, VALERIA Anderson CNP  11/25/22 2828

## 2022-11-25 NOTE — DISCHARGE INSTRUCTIONS
I recommend Flonase 2 sprays each nostril daily for 2 weeks and cetirizine 10 mg by mouth twice daily for 2 weeks.  You may use Mucinex to help loosen up the secretions as needed.  Proceed to the emergency room if you develop acute dizziness or vomiting that is uncontrolled

## 2022-11-25 NOTE — ED TRIAGE NOTES
"Pt thinks \" I have cotton stuck in my left ear from my Q tip\"       Triage Assessment     Row Name 11/25/22 0939       Triage Assessment (Adult)    Airway WDL WDL       Respiratory WDL    Respiratory WDL WDL       Cognitive/Neuro/Behavioral WDL    Cognitive/Neuro/Behavioral WDL WDL              "

## 2023-01-09 ENCOUNTER — OFFICE VISIT (OUTPATIENT)
Dept: FAMILY MEDICINE | Facility: CLINIC | Age: 63
End: 2023-01-09
Payer: COMMERCIAL

## 2023-01-09 ENCOUNTER — TELEPHONE (OUTPATIENT)
Dept: OTOLARYNGOLOGY | Facility: CLINIC | Age: 63
End: 2023-01-09

## 2023-01-09 VITALS
BODY MASS INDEX: 32.92 KG/M2 | HEIGHT: 62 IN | SYSTOLIC BLOOD PRESSURE: 132 MMHG | RESPIRATION RATE: 20 BRPM | TEMPERATURE: 98 F | HEART RATE: 90 BPM | DIASTOLIC BLOOD PRESSURE: 64 MMHG | OXYGEN SATURATION: 98 %

## 2023-01-09 DIAGNOSIS — H92.02 LEFT EAR PAIN: Primary | ICD-10-CM

## 2023-01-09 DIAGNOSIS — R20.0 LEFT FACIAL NUMBNESS: ICD-10-CM

## 2023-01-09 PROCEDURE — 90682 RIV4 VACC RECOMBINANT DNA IM: CPT | Performed by: FAMILY MEDICINE

## 2023-01-09 PROCEDURE — 99213 OFFICE O/P EST LOW 20 MIN: CPT | Mod: 25 | Performed by: FAMILY MEDICINE

## 2023-01-09 PROCEDURE — 90471 IMMUNIZATION ADMIN: CPT | Performed by: FAMILY MEDICINE

## 2023-01-09 ASSESSMENT — PAIN SCALES - GENERAL: PAINLEVEL: MILD PAIN (3)

## 2023-01-09 ASSESSMENT — PATIENT HEALTH QUESTIONNAIRE - PHQ9
10. IF YOU CHECKED OFF ANY PROBLEMS, HOW DIFFICULT HAVE THESE PROBLEMS MADE IT FOR YOU TO DO YOUR WORK, TAKE CARE OF THINGS AT HOME, OR GET ALONG WITH OTHER PEOPLE: NOT DIFFICULT AT ALL
SUM OF ALL RESPONSES TO PHQ QUESTIONS 1-9: 0
SUM OF ALL RESPONSES TO PHQ QUESTIONS 1-9: 0

## 2023-01-09 NOTE — PATIENT INSTRUCTIONS
Patient is to contact [facility]  at [phone], to schedule this appointment.

## 2023-01-09 NOTE — TELEPHONE ENCOUNTER
[facility] Call Center    Phone Message    May a detailed message be left on voicemail: yes     Reason for Call: Question regarding specialist protocol  Please follow protocols- only utilize this documentation for questions or concerns that are not clear in the protocol.  Contact clinic directly to clarify question(s) via phone or [employer] message.   Was Clinic Available: No  Question regarding protocol:  Left facial numbness [R20.0] - patient stated that there was not weakness, just more tingling.  Facial weakness not in protocols.  Been going on since middle of November along with Left ear pain [H92.02].    Is there a referral for the requested specialist/specialty? YES  Name of referring provider: Aisha Webster, DO in  [facility]  Location of referring provider: [facility]    Patient prefers [facility] but is willing to go to other locations.  Thank you.      Action Taken: Message routed to:  Other: [place] [facility]    Travel Screening: Not Applicable

## 2023-01-09 NOTE — PROGRESS NOTES
Assessment & Plan     Left ear pain  No sign of infection or fluid collection  - MR Internal Auditory Canal (IAC) w/o & w Contrast  - Adult ENT  Referral    Left facial numbness  New persistent left sided ear pain and left facial numbness. DDX includes trigeminal neuralgia, vs brainstem lesion vs acoustic neuroma? Plan to obtain MRI, recommended follow up with ENT.   - MR Internal Auditory Canal (IAC) w/o & w Contrast  - Adult ENT  Referral      Return in about 3 months (around 4/9/2023) for Follow up.    HERMILA EDUARDO [facility]    Palmer Higgins is a 62 year old, presenting for the following health issues:  Vertigo and Health Maintenance (Advised patient of . Patient will get flu shot today.)    Patient states that she has been experiencing vertigo for 2 months. She feels like something is in her left ear. Left side of head gets numb and radiates around her head, giving her a headache. Has been congested. When she sits upright, she doesn't feel dizzy. Her balance feels off sometimes. Quick movements and laying down seem to make it worse. Prednisone helped get rid of the ear pain when she is taking it.        History of Present Illness       Reason for visit:  Ear pain, dizziness  Symptom onset:  More than a month  Symptoms include:  Pain in left ear, head tingling, dizziness  Symptom intensity:  Moderate  Symptom progression:  Staying the same  Had these symptoms before:  Yes  Has tried/received treatment for these symptoms:  Yes  Previous treatment was successful:  No  What makes it worse:  Lying down, tilting head up  What makes it better:  Slow movements    She eats 2-3 servings of fruits and vegetables daily.She consumes 0 sweetened beverage(s) daily.She exercises with enough effort to increase her heart rate 9 or less minutes per day.  She exercises with enough effort to increase her heart rate 3 or less days per week.   She is taking medications  "regularly.    Today's PHQ-9         PHQ-9 Total Score: 0    PHQ-9 Q9 Thoughts of better off dead/self-harm past 2 weeks :   Not at all    How difficult have these problems made it for you to do your work, take care of things at home, or get along with other people: Not difficult at all   -Did have one episode of vertigo, primarily has resolved. Will improve symptoms with Epley maneuver.     -Primary symptoms is the ear pain-feels like earache or \"cotton in her ear\" patient is having numb sensation over left side of her face around her ear and down into her cheek.  Sensation will be worse if she is lying on that side.    -Denies decrease in her hearing or ringing in her ears. She has been having occasional headaches at the top of her head.     -Tried taking zyrtec, flonase and prednisone at recommendation of  provider-this helped with her congestion. Felt like only thing that helped with her ear pain was prednisone.     -Taking pepcid daily   -No recent illnesses over the last 3 months   -She is on vitamin D and calcium  Review of Systems   Constitutional, HEENT, cardiovascular, pulmonary, gi and gu systems are negative, except as otherwise noted.      Objective    /64 (BP Location: Right arm, Patient Position: Sitting, Cuff Size: Adult Regular)   Pulse 90   Temp 98  F (36.7  C) (Tympanic)   Resp 20   Ht 1.575 m (5' 2\")   SpO2 98%   BMI 32.92 kg/m    Body mass index is 32.92 kg/m .  Physical Exam  Constitutional:       Appearance: Normal appearance.   HENT:      Head: Normocephalic.      Right Ear: Tympanic membrane and external ear normal.      Left Ear: Tympanic membrane and external ear normal.      Nose: Nose normal.      Mouth/Throat:      Pharynx: No posterior oropharyngeal erythema.   Eyes:      Conjunctiva/sclera: Conjunctivae normal.      Pupils: Pupils are equal, round, and reactive to light.   Cardiovascular:      Rate and Rhythm: Normal rate and regular rhythm.      Pulses: Normal pulses. "   Pulmonary:      Effort: Pulmonary effort is normal.      Breath sounds: Normal breath sounds.   Abdominal:      General: Abdomen is flat.      Palpations: Abdomen is soft.   Skin:     General: Skin is warm and dry.   Neurological:      Mental Status: She is alert.      Motor: No weakness.      Coordination: Coordination normal.      Gait: Gait normal.      Deep Tendon Reflexes: Reflexes normal.      Comments: Left side of face feels less sensation compared to right     Psychiatric:         Thought Content: Thought content normal.         Judgment: Judgment normal.

## 2023-01-10 ENCOUNTER — HOSPITAL ENCOUNTER (OUTPATIENT)
Dept: MRI IMAGING | Facility: CLINIC | Age: 63
Discharge: HOME | End: 2023-01-10
Attending: FAMILY MEDICINE | Admitting: FAMILY MEDICINE
Payer: COMMERCIAL

## 2023-01-10 DIAGNOSIS — H92.02 LEFT EAR PAIN: ICD-10-CM

## 2023-01-10 DIAGNOSIS — R20.0 LEFT FACIAL NUMBNESS: ICD-10-CM

## 2023-01-10 PROCEDURE — 255N000002 HC RX 255 OP 636: Performed by: FAMILY MEDICINE

## 2023-01-10 PROCEDURE — A9585 GADOBUTROL INJECTION: HCPCS | Performed by: FAMILY MEDICINE

## 2023-01-10 PROCEDURE — 70543 MRI ORBT/FAC/NCK W/O &W/DYE: CPT

## 2023-01-10 RX ORDER — GADOBUTROL 604.72 MG/ML
8 INJECTION INTRAVENOUS ONCE
Status: COMPLETED | OUTPATIENT
Start: 2023-01-10 | End: 2023-01-10

## 2023-01-10 RX ADMIN — GADOBUTROL 8 ML: 604.72 INJECTION INTRAVENOUS at 19:47

## 2023-01-11 ENCOUNTER — OFFICE VISIT (OUTPATIENT)
Dept: AUDIOLOGY | Facility: OTHER | Age: 63
End: 2023-01-11
Attending: FAMILY MEDICINE
Payer: COMMERCIAL

## 2023-01-11 ENCOUNTER — OFFICE VISIT (OUTPATIENT)
Dept: OTOLARYNGOLOGY | Facility: OTHER | Age: 63
End: 2023-01-11
Attending: FAMILY MEDICINE
Payer: COMMERCIAL

## 2023-01-11 VITALS
HEIGHT: 62 IN | WEIGHT: 196 LBS | TEMPERATURE: 97.2 F | DIASTOLIC BLOOD PRESSURE: 72 MMHG | BODY MASS INDEX: 36.07 KG/M2 | SYSTOLIC BLOOD PRESSURE: 116 MMHG

## 2023-01-11 DIAGNOSIS — H92.02 OTALGIA, LEFT: ICD-10-CM

## 2023-01-11 DIAGNOSIS — H92.02 LEFT EAR PAIN: ICD-10-CM

## 2023-01-11 DIAGNOSIS — R42 DIZZINESSES: Primary | ICD-10-CM

## 2023-01-11 DIAGNOSIS — R20.0 LEFT FACIAL NUMBNESS: ICD-10-CM

## 2023-01-11 DIAGNOSIS — H93.8X2 PLUGGED FEELING IN EAR, LEFT: ICD-10-CM

## 2023-01-11 DIAGNOSIS — G43.009 ATYPICAL MIGRAINE: Primary | ICD-10-CM

## 2023-01-11 PROCEDURE — 92550 TYMPANOMETRY & REFLEX THRESH: CPT | Performed by: AUDIOLOGIST

## 2023-01-11 PROCEDURE — 92557 COMPREHENSIVE HEARING TEST: CPT | Performed by: AUDIOLOGIST

## 2023-01-11 PROCEDURE — 99207 PR NO CHARGE LOS: CPT | Performed by: AUDIOLOGIST

## 2023-01-11 PROCEDURE — 99204 OFFICE O/P NEW MOD 45 MIN: CPT | Performed by: OTOLARYNGOLOGY

## 2023-01-11 NOTE — PROGRESS NOTES
ENT Consultation    Kareen Helton who is a 62 year old female seen in consultation at the request of Aisha Webster DO.      History of Present Illness - Kareen Helton is a 62 year old female ear issues. Feeling plugged   Patient presents with a left ear related symptoms.  Started about 3 months ago.  Started with vertigo that she had for a number of days which has subsided.  Now feels more pressure in the ear she tried prednisone tube extreme pressure and earache away of prednisone some of the pressure still coming back she also has some paresthesias around the left parietal area of the scalp as well as around the left.  No tinnitus still off-balance sensation but no vertigo.  She denies any personal history of migraines.  She denies any photophobia phonophobia or any aura.        BP Readings from Last 1 Encounters:   01/11/23 116/72       BP noted to be well controlled today in office.     Kareen IS NOT a smoker/uses chewing tobacco.     Past Medical History - No past medical history on file.    Current Medications -   Current Outpatient Medications:      atenolol (TENORMIN) 25 MG tablet, Take 0.5 tablets (12.5 mg) by mouth daily, Disp: 45 tablet, Rfl: 3     venlafaxine (EFFEXOR) 37.5 MG tablet, Take 1 tablet (37.5 mg) by mouth daily, Disp: 90 tablet, Rfl: 3  No current facility-administered medications for this visit.    Allergies - No Known Allergies    Social History -   Social History     Socioeconomic History     Marital status: Single   Tobacco Use     Smoking status: Former     Types: Cigarettes     Smokeless tobacco: Never   Vaping Use     Vaping Use: Never used   Substance and Sexual Activity     Alcohol use: Yes     Comment: 5 per week     Drug use: Never     Sexual activity: Yes     Partners: Male       Family History -   Family History   Problem Relation Age of Onset     Hypertension Mother      Hyperlipidemia Mother      Emphysema Mother      Colorectal Cancer Father      Hypertension Maternal  "Grandmother      Hyperlipidemia Maternal Grandmother      Cerebrovascular Disease Maternal Grandmother      Emphysema Paternal Grandfather      Hypertension Brother      Hypertension Sister      Hyperlipidemia Sister      Asthma Son        Review of Systems - As per HPI and PMHx, otherwise review of system review of the head and neck negative. Otherwise 10+ review of system is negative    Physical Exam  /72   Temp 97.2  F (36.2  C) (Temporal)   Ht 1.575 m (5' 2\")   Wt 91.2 kg (201 lb)   BMI 36.76 kg/m    BMI: Body mass index is 36.76 kg/m .    General - The patient is well nourished and well developed, and appears to have good nutritional status.  Alert and oriented to person and place, answers questions and cooperates with examination appropriately.    SKIN - No suspicious lesions or rashes.  Respiration - No respiratory distress.  Head and Face - Normocephalic and atraumatic, with no gross asymmetry noted of the contour of the facial features.  The facial nerve is intact, with strong symmetric movements.    Voice and Breathing - The patient was breathing comfortably without the use of accessory muscles. The patients voice was clear and strong, and had appropriate pitch and quality.    Ears - Bilateral pinna and EACs with normal appearing overlying skin. Tympanic membrane intact with good mobility on pneumatic otoscopy bilaterally. Bony landmarks of the ossicular chain are normal. The tympanic membranes are normal in appearance. No retraction, perforation, or masses.  No fluid or purulence was seen in the external canal or the middle ear.     Eyes - Extraocular movements intact.  Sclera were not icteric or injected, conjunctiva were pink and moist.    Mouth - Examination of the oral cavity showed pink, healthy oral mucosa. No lesions or ulcerations noted.  The tongue was mobile and midline, and the dentition were in good condition.      Throat - The walls of the oropharynx were smooth, pink, moist, " symmetric, and had no lesions or ulcerations.  The tonsillar pillars and soft palate were symmetric.  The uvula was midline on elevation.    Neck - Normal midline excursion of the laryngotracheal complex during swallowing.  Full range of motion on passive movement.  Palpation of the occipital, submental, submandibular, internal jugular chain, and supraclavicular nodes did not demonstrate any abnormal lymph nodes or masses.  The carotid pulse was palpable bilaterally.  Palpation of the thyroid was soft and smooth, with no nodules or goiter appreciated.  The trachea was mobile and midline.    Nose - External contour is symmetric, no gross deflection or scars.  Nasal mucosa is pink and moist with no abnormal mucus.  The septum was midline and non-obstructive, turbinates of normal size and position.  No polyps, masses, or purulence noted on examination.    Neuro - Nonfocal neuro exam is normal, CN 2 through 12 intact, normal gait and muscle tone.  There appears to be some difference in perception to light touch slightly less on the left than the right around the left ear and parietal area.      Performed in clinic today:  Audiologic Studies - An audiogram and tympanogram were performed today as part of the evaluation and personally reviewed. The tympanogram shows normal Type A curves, with normal canal volumes and middle ear pressures.  There is no sign of eustachian tube dysfunction or middle ear effusion.  The audiogram was also normal.  The sensorineural hearing was age-appropriate, with no evidence of conductive hearing loss or significant asymmetry.      A/P - Kareen Helton is a 62 year old female with a some neurologic findings of paresthesias on the left side along ear pressure and disequilibrium.  This could be migrainous in nature but needs further investigation.  Patient already had MRI of the brain and internal auditory canals ordered and results are pending.  We will review those and get in touch with the  patient regarding the results.  In the meantime also refer patient to a see neurology for further evaluation.      Satish Contreras MD

## 2023-01-11 NOTE — PROGRESS NOTES
AUDIOLOGY REPORT:    Patient was referred from ENT by Dr. Contreras for audiology evaluation. The patient reports that she had an episode of vertigo on 10/30/2022. She has had occasional vertigo in the past and tried an Epley maneuver, which did not help. The dizziness has improved but she still has occasional lightheadedness. She reports that since around that time she has also had a sensation that there is something in her left ear. She also notes discomfort, and that it feels like an ear infection. The patient reports that she has had her ear checked and nothing is there. She also notes numbness in the left side of her face. The patient reports that she has been having headaches, which is unusual for her. She has tried prednisone, Zyrtec, a nasal spray, and meclizine. She had an MRI last night but does not have the results yet. She has not noted any changes in hearing and does not have tinnitus. The patient reports that she had ear infections as a child and adolescent, and occasionally as an adult. She reports that she has not had ear surgery.    Testing:    Otoscopy:   Otoscopic exam indicates ears are clear of cerumen bilaterally     Tympanograms:    RIGHT: normal eardrum mobility     LEFT:   normal eardrum mobility    Reflexes (reported by stimulus ear):  RIGHT: Ipsilateral is absent at frequencies tested  RIGHT: Contralateral is absent at frequencies tested  LEFT:   Ipsilateral is absent at frequencies tested  LEFT:   Contralateral is absent at frequencies tested    Thresholds:   Pure Tone Thresholds assessed using conventional audiometry with good reliability from 250-8000 Hz bilaterally using insert earphones and circumaural headphones     RIGHT:  normal hearing sensitivity at all tested frequencies     LEFT:    normal hearing sensitivity at all tested frequencies     Speech Reception Threshold:    RIGHT: 10 dB HL    LEFT:   15 dB HL  Results are in agreement with pure tone average.     Word Recognition  Score:     RIGHT: 96% at 55 dB HL using NU-6 recorded word list.    LEFT:   96% at 55 dB HL using NU-6 recorded word list.    Discussed results with the patient.     Patient was returned to ENT for follow up.     Ronan Arevalo, CCC-A  Licensed Audiologist #[id number]  1/11/2023

## 2023-02-23 ENCOUNTER — TELEPHONE (OUTPATIENT)
Dept: FAMILY MEDICINE | Facility: CLINIC | Age: 63
End: 2023-02-23
Payer: COMMERCIAL

## 2023-02-23 NOTE — TELEPHONE ENCOUNTER
Patient Quality Outreach    Patient is due for the following:   Colon Cancer Screening  Depression  -  DAP  Physical Preventive Adult Physical    Next Steps:   Schedule physical, discuss HM at appt    Type of outreach:    Sent [employer] message.        Questions for provider review:    None     Kiara Pate          
57 y/o M w/ PMH of CAD s/p PCI, HTN, dyslipidemia, DM2, pancreatitis, obstructive sleep apnea, p/w chest pain. Patient had stent placed recently on 1/26/22. Patient states he was CP free after stent placement for a few days. However, CP returned after a few days. CP is substernal, sharp in quality, radiates to neck and occasionally associated with RUE numbness. CP is intermittent, and currently patient is not experiencing any CP. States CP can occur when he is exerting himself or when he is sitting and resting. Denies nausea, vomiting, diophoresis, palpitations. +Occasional cough. Denies runny nose, sore throat, fever, chills, nausea, vomiting, abdominal pain.     PSH: PCI, cholecystectomy, shoulder surgery     Social Hx: Denies x 3    Family Hx: Both parents had heart disease

## 2023-05-14 DIAGNOSIS — I10 ESSENTIAL HYPERTENSION: ICD-10-CM

## 2023-05-15 RX ORDER — ATENOLOL 25 MG/1
TABLET ORAL
Qty: 45 TABLET | Refills: 0 | Status: SHIPPED | OUTPATIENT
Start: 2023-05-15 | End: 2023-06-06

## 2023-05-20 ENCOUNTER — HEALTH MAINTENANCE LETTER (OUTPATIENT)
Age: 63
End: 2023-05-20

## 2023-05-22 ENCOUNTER — MYC REFILL (OUTPATIENT)
Dept: FAMILY MEDICINE | Facility: CLINIC | Age: 63
End: 2023-05-22
Payer: COMMERCIAL

## 2023-05-22 DIAGNOSIS — F33.42 RECURRENT MAJOR DEPRESSION IN COMPLETE REMISSION (H): Primary | ICD-10-CM

## 2023-05-22 RX ORDER — VENLAFAXINE 37.5 MG/1
37.5 TABLET ORAL DAILY
Qty: 90 TABLET | Refills: 0 | Status: SHIPPED | OUTPATIENT
Start: 2023-05-22 | End: 2023-06-06

## 2023-05-22 NOTE — TELEPHONE ENCOUNTER
"Routing refill request to provider for review/approval because:  Labs not current:  Cr  Needs to establish care with a provider/Dr. Correa has never seen patient     My chart message sent      Pending Prescriptions:                       Disp   Refills    venlafaxine (EFFEXOR) 37.5 MG tablet      90 tab*0            Sig: Take 1 tablet (37.5 mg) by mouth daily      Requested Prescriptions   Pending Prescriptions Disp Refills     venlafaxine (EFFEXOR) 37.5 MG tablet 90 tablet 0     Sig: Take 1 tablet (37.5 mg) by mouth daily       Serotonin-Norepinephrine Reuptake Inhibitors  Failed - 5/22/2023  9:46 AM        Failed - Normal serum creatinine on file in past 12 months     Recent Labs   Lab Test 02/14/22  0811   CR 0.85       Ok to refill medication if creatinine is low          Passed - Blood pressure under 140/90 in past 12 months     BP Readings from Last 3 Encounters:   01/11/23 116/72   01/09/23 132/64   11/25/22 135/72                 Passed - PHQ-9 score of less than 5 in past 6 months     Please review last PHQ-9 score.           Passed - Medication is active on med list        Passed - Patient is age 18 or older        Passed - No active pregnancy on record        Passed - No positive pregnancy test in past 12 months        Passed - Recent (6 mo) or future (30 days) visit within the authorizing provider's specialty     Patient had office visit in the last 6 months or has a visit in the next 30 days with authorizing provider or within the authorizing provider's specialty.  See \"Patient Info\" tab in inbasket, or \"Choose Columns\" in Meds & Orders section of the refill encounter.               Cat Crooks RN on 5/22/2023 at 2:56 PM    "

## 2023-06-03 ASSESSMENT — ENCOUNTER SYMPTOMS
NAUSEA: 0
NERVOUS/ANXIOUS: 0
ABDOMINAL PAIN: 0
DIZZINESS: 0
JOINT SWELLING: 0
FREQUENCY: 0
PARESTHESIAS: 0
PALPITATIONS: 0
HEMATOCHEZIA: 0
DIARRHEA: 0
CHILLS: 0
BREAST MASS: 0
SHORTNESS OF BREATH: 0
COUGH: 0
DYSURIA: 0
EYE PAIN: 0
CONSTIPATION: 0
HEARTBURN: 0
SORE THROAT: 0
MYALGIAS: 0
HEADACHES: 0
FEVER: 0
ARTHRALGIAS: 0
HEMATURIA: 0
WEAKNESS: 0

## 2023-06-06 ENCOUNTER — LAB (OUTPATIENT)
Dept: LAB | Facility: CLINIC | Age: 63
End: 2023-06-06
Payer: COMMERCIAL

## 2023-06-06 ENCOUNTER — OFFICE VISIT (OUTPATIENT)
Dept: FAMILY MEDICINE | Facility: CLINIC | Age: 63
End: 2023-06-06
Payer: COMMERCIAL

## 2023-06-06 VITALS
DIASTOLIC BLOOD PRESSURE: 88 MMHG | OXYGEN SATURATION: 96 % | SYSTOLIC BLOOD PRESSURE: 130 MMHG | HEIGHT: 62 IN | RESPIRATION RATE: 16 BRPM | HEART RATE: 65 BPM | TEMPERATURE: 98.3 F | WEIGHT: 204.2 LBS | BODY MASS INDEX: 37.58 KG/M2

## 2023-06-06 DIAGNOSIS — I10 ESSENTIAL HYPERTENSION: ICD-10-CM

## 2023-06-06 DIAGNOSIS — Z13.6 CARDIOVASCULAR SCREENING; LDL GOAL LESS THAN 130: ICD-10-CM

## 2023-06-06 DIAGNOSIS — Z12.31 SCREENING MAMMOGRAM, ENCOUNTER FOR: ICD-10-CM

## 2023-06-06 DIAGNOSIS — F33.42 RECURRENT MAJOR DEPRESSION IN COMPLETE REMISSION (H): ICD-10-CM

## 2023-06-06 DIAGNOSIS — E66.01 MORBID OBESITY (H): ICD-10-CM

## 2023-06-06 DIAGNOSIS — Z00.00 ROUTINE PHYSICAL EXAMINATION: Primary | ICD-10-CM

## 2023-06-06 DIAGNOSIS — Z12.11 SCREEN FOR COLON CANCER: ICD-10-CM

## 2023-06-06 DIAGNOSIS — Z80.0 FAMILY HISTORY OF COLON CANCER: ICD-10-CM

## 2023-06-06 LAB
ANION GAP SERPL CALCULATED.3IONS-SCNC: 12 MMOL/L (ref 7–15)
BUN SERPL-MCNC: 10.7 MG/DL (ref 8–23)
CALCIUM SERPL-MCNC: 9.7 MG/DL (ref 8.8–10.2)
CHLORIDE SERPL-SCNC: 101 MMOL/L (ref 98–107)
CHOLEST SERPL-MCNC: 189 MG/DL
CREAT SERPL-MCNC: 0.82 MG/DL (ref 0.51–0.95)
DEPRECATED HCO3 PLAS-SCNC: 28 MMOL/L (ref 22–29)
GFR SERPL CREATININE-BSD FRML MDRD: 81 ML/MIN/1.73M2
GLUCOSE SERPL-MCNC: 93 MG/DL (ref 70–99)
HDLC SERPL-MCNC: 47 MG/DL
LDLC SERPL CALC-MCNC: 117 MG/DL
NONHDLC SERPL-MCNC: 142 MG/DL
POTASSIUM SERPL-SCNC: 4.5 MMOL/L (ref 3.4–5.3)
SODIUM SERPL-SCNC: 141 MMOL/L (ref 136–145)
TRIGL SERPL-MCNC: 125 MG/DL

## 2023-06-06 PROCEDURE — 36415 COLL VENOUS BLD VENIPUNCTURE: CPT

## 2023-06-06 PROCEDURE — 80048 BASIC METABOLIC PNL TOTAL CA: CPT

## 2023-06-06 PROCEDURE — 99396 PREV VISIT EST AGE 40-64: CPT | Performed by: NURSE PRACTITIONER

## 2023-06-06 PROCEDURE — 80061 LIPID PANEL: CPT

## 2023-06-06 PROCEDURE — 99213 OFFICE O/P EST LOW 20 MIN: CPT | Mod: 25 | Performed by: NURSE PRACTITIONER

## 2023-06-06 RX ORDER — ATENOLOL 25 MG/1
12.5 TABLET ORAL DAILY
Qty: 45 TABLET | Refills: 3 | Status: SHIPPED | OUTPATIENT
Start: 2023-06-06 | End: 2024-08-09

## 2023-06-06 RX ORDER — VENLAFAXINE 37.5 MG/1
37.5 TABLET ORAL DAILY
Qty: 90 TABLET | Refills: 3 | Status: SHIPPED | OUTPATIENT
Start: 2023-06-06 | End: 2024-08-09

## 2023-06-06 ASSESSMENT — ENCOUNTER SYMPTOMS
DIARRHEA: 0
PALPITATIONS: 0
HEMATOCHEZIA: 0
SHORTNESS OF BREATH: 0
FREQUENCY: 0
NERVOUS/ANXIOUS: 0
ABDOMINAL PAIN: 0
FEVER: 0
CHILLS: 0
HEADACHES: 0
MYALGIAS: 0
ARTHRALGIAS: 0
DYSURIA: 0
PARESTHESIAS: 0
WEAKNESS: 0
BREAST MASS: 0
NAUSEA: 0
SORE THROAT: 0
DIZZINESS: 0
HEMATURIA: 0
EYE PAIN: 0
JOINT SWELLING: 0
CONSTIPATION: 0
COUGH: 0
HEARTBURN: 0

## 2023-06-06 NOTE — PROGRESS NOTES
SUBJECTIVE:   CC: Ronaldo is an 62 year old who presents for preventive health visit.       6/6/2023     2:30 PM   Additional Questions   Roomed by Jodi HERNANDEZ     Healthy Habits:    Getting at least 3 servings of Calcium per day:  Yes    Bi-annual eye exam:  Yes    Dental care twice a year:  Yes    Sleep apnea or symptoms of sleep apnea:  Excessive snoring    Diet:  Regular (no restrictions)    Frequency of exercise:  2-3 days/week    Duration of exercise:  15-30 minutes    Medication side effects:  Not applicable    PHQ-2 Total Score:    Additional concerns today:  No      Hypertension Follow-up      Do you check your blood pressure regularly outside of the clinic? No     Are you following a low salt diet? No    Are your blood pressures ever more than 140 on the top number (systolic) OR more   than 90 on the bottom number (diastolic), for example 140/90? Not checking     Depression Followup    How are you doing with your depression since your last visit? No change    Are you having other symptoms that might be associated with depression? No    Have you had a significant life event?  No     Are you feeling anxious or having panic attacks?   No    Do you have any concerns with your use of alcohol or other drugs? No    Social History     Tobacco Use     Smoking status: Former     Types: Cigarettes     Smokeless tobacco: Never     Tobacco comments:     I smoked between the ages of 17 and 24   Vaping Use     Vaping status: Never Used   Substance Use Topics     Alcohol use: Yes     Comment: 5 per week     Drug use: Never         2/14/2022     7:13 AM 7/25/2022     3:51 PM 1/9/2023     7:29 AM   PHQ   PHQ-9 Total Score 0 0 0   Q9: Thoughts of better off dead/self-harm past 2 weeks Not at all Not at all Not at all         7/25/2022     3:50 PM   DEREK-7 SCORE   Total Score 0 (minimal anxiety)   Total Score 0         Suicide Assessment Five-step Evaluation and Treatment (SAFE-T)          Social History     Tobacco Use      Smoking status: Former     Types: Cigarettes     Smokeless tobacco: Never     Tobacco comments:     I smoked between the ages of 17 and 24   Vaping Use     Vaping status: Never Used   Substance Use Topics     Alcohol use: Yes     Comment: 5 per week             6/3/2023     1:56 PM   Alcohol Use   Prescreen: >3 drinks/day or >7 drinks/week? Yes   AUDIT SCORE  4     Reviewed orders with patient.  Reviewed health maintenance and updated orders accordingly - Yes  BP Readings from Last 3 Encounters:   06/06/23 130/88   01/11/23 116/72   01/09/23 132/64    Wt Readings from Last 3 Encounters:   06/06/23 92.6 kg (204 lb 3.2 oz)   01/11/23 88.9 kg (196 lb)   10/31/22 81.6 kg (180 lb)                  Patient Active Problem List   Diagnosis     Recurrent major depression in complete remission (H)     Essential hypertension     No past surgical history on file.    Social History     Tobacco Use     Smoking status: Former     Types: Cigarettes     Smokeless tobacco: Never     Tobacco comments:     I smoked between the ages of 17 and 24   Vaping Use     Vaping status: Never Used   Substance Use Topics     Alcohol use: Yes     Comment: 5 per week     Family History   Problem Relation Age of Onset     Hypertension Mother      Hyperlipidemia Mother      Emphysema Mother      Colorectal Cancer Father      Cerebrovascular Disease Father      Colon Cancer Father      Hypertension Maternal Grandmother      Hyperlipidemia Maternal Grandmother      Cerebrovascular Disease Maternal Grandmother      Emphysema Paternal Grandfather      Hypertension Brother      Hypertension Sister      Hyperlipidemia Sister      Asthma Son          Current Outpatient Medications   Medication Sig Dispense Refill     atenolol (TENORMIN) 25 MG tablet Take 0.5 tablets (12.5 mg) by mouth daily 45 tablet 3     venlafaxine (EFFEXOR) 37.5 MG tablet Take 1 tablet (37.5 mg) by mouth daily 90 tablet 3       Breast Cancer Screening:    FHS-7:       2/14/2022     6:57  AM 7/25/2022     7:56 AM 6/3/2023     1:57 PM   Breast CA Risk Assessment (FHS-7)   Did any of your first-degree relatives have breast or ovarian cancer? No No No   Did any of your relatives have bilateral breast cancer? No No No   Did any man in your family have breast cancer? No No No   Did any woman in your family have breast and ovarian cancer? No No No   Did any woman in your family have breast cancer before age 50 y? No No No   Do you have 2 or more relatives with breast and/or ovarian cancer? No No No   Do you have 2 or more relatives with breast and/or bowel cancer? No No No       Mammogram Screening: Recommended mammography every 1-2 years with patient discussion and risk factor consideration  Pertinent mammograms are reviewed under the imaging tab.    History of abnormal Pap smear: YES - updated in Problem List and Health Maintenance accordingly     Reviewed and updated as needed this visit by clinical staff   Tobacco  Allergies  Meds              Reviewed and updated as needed this visit by Provider                     Review of Systems   Constitutional: Negative for chills and fever.   HENT: Negative for congestion, ear pain, hearing loss and sore throat.    Eyes: Negative for pain and visual disturbance.   Respiratory: Negative for cough and shortness of breath.    Cardiovascular: Negative for chest pain, palpitations and peripheral edema.   Gastrointestinal: Negative for abdominal pain, constipation, diarrhea, heartburn, hematochezia and nausea.   Breasts:  Negative for tenderness, breast mass and discharge.   Genitourinary: Negative for dysuria, frequency, genital sores, hematuria, pelvic pain, urgency, vaginal bleeding and vaginal discharge.   Musculoskeletal: Negative for arthralgias, joint swelling and myalgias.   Skin: Negative for rash.   Neurological: Negative for dizziness, weakness, headaches and paresthesias.   Psychiatric/Behavioral: Negative for mood changes. The patient is not  "nervous/anxious.           OBJECTIVE:   /88   Pulse 65   Temp 98.3  F (36.8  C) (Tympanic)   Resp 16   Ht 1.575 m (5' 2\")   Wt 92.6 kg (204 lb 3.2 oz)   SpO2 96%   BMI 37.35 kg/m    Physical Exam  GENERAL: healthy, alert and no distress  EYES: Eyes grossly normal to inspection and conjunctivae and sclerae normal  HENT: ear canals and TM's normal, nose and mouth without ulcers or lesions  NECK: no adenopathy, no asymmetry, masses, or scars and thyroid normal to palpation  RESP: lungs clear to auscultation - no rales, rhonchi or wheezes  CV: regular rates and rhythm, normal S1 S2, no S3 or S4, no murmur, click or rub and no peripheral edema  ABDOMEN: soft, nontender and no palpable or pulsatile masses  MS: no gross musculoskeletal defects noted, no edema  SKIN: no suspicious lesions or rashes  NEURO: Normal strength and tone, mentation intact and speech normal  PSYCH: mentation appears normal, affect normal/bright    Diagnostic Test Results:  Labs reviewed in Epic    ASSESSMENT/PLAN:   Kareen was seen today for physical.    Diagnoses and all orders for this visit:    Routine physical examination    Recurrent major depression in complete remission (H)  -     venlafaxine (EFFEXOR) 37.5 MG tablet; Take 1 tablet (37.5 mg) by mouth daily    Essential hypertension  -     Basic metabolic panel  (Ca, Cl, CO2, Creat, Gluc, K, Na, BUN); Future  -     atenolol (TENORMIN) 25 MG tablet; Take 0.5 tablets (12.5 mg) by mouth daily    Screen for colon cancer  -     Colonoscopy Screening  Referral; Future    CARDIOVASCULAR SCREENING; LDL GOAL LESS THAN 130  -     Lipid panel reflex to direct LDL Fasting; Future    Screening mammogram, encounter for  -     *MA Screening Digital Bilateral; Future    Family history of colon cancer    Morbid obesity (H)  - blood pressure would benefit from weight loss    Other orders  -     REVIEW OF HEALTH MAINTENANCE PROTOCOL ORDERS            COUNSELING:  Reviewed preventive health " counseling, as reflected in patient instructions        She reports that she has quit smoking. Her smoking use included cigarettes. She has never used smokeless tobacco.          VALERIA Cardona CNP [facility]

## 2023-06-07 NOTE — RESULT ENCOUNTER NOTE
Please inform patient that test result was within normal parameters.   Thank you.     Arthur Barker M.D.

## 2023-06-08 NOTE — RESULT ENCOUNTER NOTE
Everett Mathur    Your LDL (bad cholesterol) is slightly above goal. Cholesterol lowering medications are recommended at a 10 year cardiac risk score of 7.5% or higher. Your cardiac risk score is:    The 10-year ASCVD risk score (Isra GARLAND, et al., 2019) is: 4.9%    Values used to calculate the score:      Age: 62 years      Sex: Female      Is Non- : No      Diabetic: No      Tobacco smoker: No      Systolic Blood Pressure: 130 mmHg      Is BP treated: Yes      HDL Cholesterol: 47 mg/dL      Total Cholesterol: 189 mg/dL    Recommend heart healthy diet and recheck in 1 year. You can visit:     https://www.mayoclinic.org/healthy-lifestyle/nutrition-and-healthy-eating/in-depth/mediterranean-diet/art-84302272    For some information on a healthy diet.       Please let us know if you have any questions.     Take care,    VALERIA Chung CNP

## 2023-06-27 ENCOUNTER — TELEPHONE (OUTPATIENT)
Dept: FAMILY MEDICINE | Facility: CLINIC | Age: 63
End: 2023-06-27
Payer: COMMERCIAL

## 2023-06-27 NOTE — TELEPHONE ENCOUNTER
Patient Quality Outreach    Patient is due for the following:   Colon Cancer Screening  Breast Cancer Screening - Mammogram    Next Steps:   Schedule a mammo and colonoscopy    Type of outreach:    Sent letter.    Next Steps:  Reach out within 90 days via Letter.    Max number of attempts reached: No. Will try again in 90 days if patient still on fail list.    Questions for provider review:    None           Chiquis Morris, [facility]

## 2023-06-27 NOTE — LETTER
June 27, 2023    To  Kareen Helton  [address] [facility] [address]    Your team at [facility] cares about your health. We have reviewed your chart and based on our findings; we are making the following recommendations to better manage your health.     You are in particular need of attention regarding the following:     Schedule Annual MAMMOGRAPHY. [facility] scheduling number is [phone] or schedule in [email or web address] (self referral).  1 in 8 women will develop invasive breast cancer during her lifetime and it is the most common non-skin cancer in American Women. EARLY detection, new treatments, and a better understanding of the disease have increased survival rates- the 5 year survival rate in the 1960's was 63% and today it is close to 90%.  Colon cancer is now the second leading cause of cancer-related deaths in the [place] for both men and women and there are over 130,000 new cases and 50,000 deaths per year from colon cancer. Colonoscopies can prevent 90-95% of these deaths. Problem lesions can be removed before they ever become cancer. This test is not only looking for cancer, but also getting rid of precancerous lesions.   Please call [phone] to schedule a colonoscopy.    If you have already completed these items, please contact the clinic via phone or   [email or web address] so your care team can review and update your records. Thank you for   choosing [facility] for your healthcare needs. For any questions,   concerns, or to schedule an appointment please contact our clinic.    Healthy Regards,      Your [facility] Care Team

## 2023-07-21 ENCOUNTER — TELEPHONE (OUTPATIENT)
Dept: SURGERY | Facility: CLINIC | Age: 63
End: 2023-07-21
Payer: COMMERCIAL

## 2023-07-21 NOTE — TELEPHONE ENCOUNTER
Screening Questions  BLUE  KIND OF PREP RED  LOCATION [review exclusion criteria] GREEN  SEDATION TYPE        Y Are you active on mychart?       FAHEEM Ordering/Referring Provider?        MEDICA What type of coverage do you have?      N Have you had a positive covid test in the last 14 days?     37.35 1. BMI  [BMI 40+ - review exclusion criteria& smart-phrase document]    Y  2. Are you able to give consent for your medical care? [IF NO,RN REVIEW]          N  3. Are you taking any prescription pain medications on a routine schedule   (ex narcotics: oxycodone, roxicodone, oxycontin,  and percocet)? [RN Review]        N  3a. EXTENDED PREP What kind of prescription?     N 4. Do you have any chemical dependencies such as alcohol, street drugs, or methadone?        **If yes 3- 5 , please schedule with MAC sedation.**          IF YES TO ANY 6 - 10 - HOSPITAL SETTING ONLY.     N 6.   Do you need assistance transferring?     N 7.   Have you had a heart or lung transplant?    N 8.   Are you currently on dialysis?   N 9.   Do you use daily home oxygen?   N 10. Do you take nitroglycerin?   10a. N If yes, how often?     N 11. Are you currently pregnant?    11a. N If yes, how many weeks? [ Greater than 12 weeks, OR NEEDED]    N 12. Do you have Pulmonary Hypertension? *NEED PAC APPT AT [facility] w/ [facility]*     N 13. [review exclusion criteria]  Do you have any implantable devices in your body (pacemaker, defib, LVAD)?    N 14. In the past 6 months, have you had any heart related issues including cardiomyopathy or heart attack?     14a. N If yes, did it require cardiac stenting if so when?     N 15. Have you had a stroke or Transient ischemic attack (TIA - aka  mini stroke ) within 6 months?      N 16. Do you have mod to severe Obstructive Sleep Apnea?  [Hospital only]    N 17. Do you have SEVERE AND UNCONTROLLED asthma? *NEED PAC APPT AT [facility] w/[facility]*     18.Do you take blood thinners?  No    N 19. Do you take any of the following  "medications?    N Phentermine    N Ozempic    N Wegovy (Semaglutide)      19a. If yes, \"Hold for 7 days before procedure.  Please consult your prescribing provider if you have questions about holding this medication.\"     N  20. Do you have chronic kidney disease?      N  21. Do you have a diagnosis of diabetes?     N  22. On a regular basis do you go 3-5 days between bowel movements?     Y 23. Preferred LOCAL Pharmacy for Pre Prescription      [id number] - [facility] [address]        - CLOSING REMINDERS -  You will receive a call from a Nurse to review instructions and health history.  This assessment must be completed prior to your procedure.  Failure to complete the Nurse assessment may result in the procedure being cancelled.    On the day of your procedure, please designatean adult(s) who can drive you home stay with you for the next 24 hours. The medicines used in the exam will make you sleepy. You will not be able to drive.    You cannot take public transportation, ride share services, or non-medical taxi service without a responsible caregiver.  Medical transport services are allowed with the requirement that a responsible caregiver will receive you at your destination.  We require that drivers and caregivers are confirmed prior to your procedure.      - SCHEDULING DETAILS -  N & N Hospital Setting Required & If yes, what is the exclusion?   MICHA  Surgeon    8/1/23  Date of Procedure  Lower Endoscopy [Colonoscopy]  Type of Procedure Scheduled  [facility]- If you answer yes to questions #8, #20, #21 [  pts ]Which Colonoscopy Prep was Sent?     GENERAL  Sedation Type     N PAC / Pre-op Required      "

## 2023-08-08 DIAGNOSIS — F33.42 RECURRENT MAJOR DEPRESSION IN COMPLETE REMISSION (H): ICD-10-CM

## 2023-08-08 RX ORDER — VENLAFAXINE 37.5 MG/1
TABLET ORAL
Qty: 90 TABLET | Refills: 3 | OUTPATIENT
Start: 2023-08-08

## 2023-08-14 ENCOUNTER — ANCILLARY PROCEDURE (OUTPATIENT)
Dept: MAMMOGRAPHY | Facility: CLINIC | Age: 63
End: 2023-08-14
Attending: NURSE PRACTITIONER
Payer: COMMERCIAL

## 2023-08-14 DIAGNOSIS — Z12.31 SCREENING MAMMOGRAM, ENCOUNTER FOR: ICD-10-CM

## 2023-08-14 PROCEDURE — 77063 BREAST TOMOSYNTHESIS BI: CPT | Mod: TC | Performed by: RADIOLOGY

## 2023-08-14 PROCEDURE — 77067 SCR MAMMO BI INCL CAD: CPT | Mod: TC | Performed by: RADIOLOGY

## 2023-08-24 ENCOUNTER — HOSPITAL ENCOUNTER (EMERGENCY)
Facility: CLINIC | Age: 63
Discharge: HOME | End: 2023-08-24
Attending: EMERGENCY MEDICINE | Admitting: EMERGENCY MEDICINE
Payer: COMMERCIAL

## 2023-08-24 VITALS
TEMPERATURE: 97.9 F | HEART RATE: 94 BPM | SYSTOLIC BLOOD PRESSURE: 137 MMHG | DIASTOLIC BLOOD PRESSURE: 98 MMHG | RESPIRATION RATE: 16 BRPM | OXYGEN SATURATION: 91 %

## 2023-08-24 DIAGNOSIS — L50.9 URTICARIA: ICD-10-CM

## 2023-08-24 DIAGNOSIS — T78.40XA ALLERGIC REACTION, INITIAL ENCOUNTER: ICD-10-CM

## 2023-08-24 PROCEDURE — 99284 EMERGENCY DEPT VISIT MOD MDM: CPT | Mod: 25

## 2023-08-24 PROCEDURE — 250N000012 HC RX MED GY IP 250 OP 636 PS 637: Performed by: EMERGENCY MEDICINE

## 2023-08-24 PROCEDURE — 250N000009 HC RX 250: Performed by: EMERGENCY MEDICINE

## 2023-08-24 RX ORDER — EPINEPHRINE 0.3 MG/.3ML
0.3 INJECTION SUBCUTANEOUS
Qty: 2 EACH | Refills: 0 | Status: SHIPPED | OUTPATIENT
Start: 2023-08-24 | End: 2023-10-03

## 2023-08-24 RX ORDER — PREDNISONE 20 MG/1
60 TABLET ORAL DAILY
Qty: 12 TABLET | Refills: 0 | Status: SHIPPED | OUTPATIENT
Start: 2023-08-24 | End: 2023-08-28

## 2023-08-24 RX ORDER — PREDNISONE 20 MG/1
60 TABLET ORAL ONCE
Status: COMPLETED | OUTPATIENT
Start: 2023-08-24 | End: 2023-08-24

## 2023-08-24 RX ADMIN — PREDNISONE 60 MG: 20 TABLET ORAL at 00:48

## 2023-08-24 RX ADMIN — EPINEPHRINE 0.3 MG: 1 INJECTION INTRAMUSCULAR; INTRAVENOUS; SUBCUTANEOUS at 00:48

## 2023-08-24 ASSESSMENT — ACTIVITIES OF DAILY LIVING (ADL)
ADLS_ACUITY_SCORE: 35
ADLS_ACUITY_SCORE: 35

## 2023-08-24 NOTE — ED PROVIDER NOTES
History     Chief Complaint:  Allergic Reaction       HPI   Kareen Helton is a 62 year old female who presents with hives and itching.  Patient reports that she woke up with some itching and hives on the medial thighs in the morning.  Through the day she had further progression with the hives and itching more diffusely, and this evening developed some swelling to the left side of the lip, ultimately prompting her to come to the hospital.  She reports that she had recently been on amoxicillin for dental pain, and completed that antibiotic.  She still had some discomfort and had some Keflex that she had never taken before.  She took a dose of Keflex last night and in the morning prior to the symptoms starting.  She also reports that she has been at the [employer] for a convention and eating different foods.  She denies any throat swelling or difficulty breathing.  She did take 2 Benadryl this evening along with trying Benadryl cream.      Allergies:  No Known Allergies     Medications:    EPINEPHrine (ANY BX GENERIC EQUIV) 0.3 MG/0.3ML injection 2-pack  predniSONE (DELTASONE) 20 MG tablet  atenolol (TENORMIN) 25 MG tablet  venlafaxine (EFFEXOR) 37.5 MG tablet        Past Medical History:    Past Medical History:   Diagnosis Date    Essential hypertension 6/6/2023       Past Surgical History:    No past surgical history on file.     Family History:    family history includes Asthma in her son; Cerebrovascular Disease in her father and maternal grandmother; Colon Cancer in her father; Colorectal Cancer in her father; Emphysema in her mother and paternal grandfather; Hyperlipidemia in her maternal grandmother, mother, and sister; Hypertension in her brother, maternal grandmother, mother, and sister.    Social History:   reports that she has quit smoking. Her smoking use included cigarettes. She has never used smokeless tobacco. She reports current alcohol use. She reports that she does not use drugs.  PCP: No  Ref-Primary, Physician     Physical Exam   Patient Vitals for the past 24 hrs:   BP Temp Temp src Pulse Resp SpO2   08/24/23 0315 -- -- -- -- -- 91 %   08/24/23 0300 (!) 137/98 -- -- 94 -- --   08/24/23 0215 (!) 142/83 -- -- -- -- 92 %   08/24/23 0210 (!) 142/83 -- -- 82 -- 94 %   08/24/23 0200 123/74 -- -- 82 -- 91 %   08/24/23 0130 -- -- -- -- -- 92 %   08/24/23 0115 -- -- -- -- -- 93 %   08/24/23 0100 125/71 -- -- 77 -- 92 %   08/24/23 0026 -- -- -- -- -- 95 %   08/24/23 0025 137/85 -- -- 85 -- --   08/24/23 0017 139/74 97.9  F (36.6  C) Temporal 81 16 92 %        Physical Exam  General: Appears well-developed and well-nourished.   Head: No signs of trauma.   Mouth/Throat: Swelling to left lower lip, no further oral swelling noted.  Eyes: Conjunctivae are normal.   Neck: Normal range of motion. No nuchal rigidity. No cervical adenopathy  CV: Normal rate and regular rhythm.    Resp: Effort normal and breath sounds normal. No respiratory distress.   GI: Soft. There is no tenderness.  No rebound or guarding.  Normal bowel sounds.   MSK: Normal range of motion.   Neuro: The patient is alert and oriented. Speech normal.  Skin: Skin is warm and dry. Scattered diffuse urticaria noted.  Psych: normal mood and affect. behavior is normal.       Emergency Department Course     Emergency Department Course & Assessments:    Interventions:  Medications   EPINEPHrine (ADRENALIN) kit 0.3 mg (0.3 mg Intramuscular $Given 8/24/23 0048)   predniSONE (DELTASONE) tablet 60 mg (60 mg Oral $Given 8/24/23 0048)        Social Determinants of Health affecting care:   None    Disposition:  The patient was discharged to home.     Impression & Plan    Medical Decision Making:  Kareen Helton is a 62 year old female presents with diffuse urticaria and lower lip swelling.  Symptoms started in the morning and progressed throughout the day.  Patient reports that she had recently started Keflex which she had never taken before but also has been  eating new foods at the [facility], so the cause of the urticaria is not clear.  She had taken 2 Benadryl shortly prior to arrival.  She was given prednisone and after discussion, the patient was also given epinephrine.  She was monitored for a number of hours in the [place] and had continued improvement of her symptoms.  In this setting, felt that she is appropriate for discharge home as I do not suspect she will have further rapid worsening at this point.  Recommended she continue with the antihistamine along with the steroid and she was given a prescription for an EpiPen to have if she had return or worsening oral swelling or further concerns.  I did recommend following up with her doctor in clinic and that she stopped the antibiotic.      Diagnosis:    ICD-10-CM    1. Urticaria  L50.9       2. Allergic reaction, initial encounter  T78.40XA            Discharge Medications:  Discharge Medication List as of 8/24/2023  3:30 AM        START taking these medications    Details   EPINEPHrine (ANY BX GENERIC EQUIV) 0.3 MG/0.3ML injection 2-pack Inject 0.3 mLs (0.3 mg) into the muscle once as needed for anaphylaxis, Disp-2 each, R-0, E-Prescribe      predniSONE (DELTASONE) 20 MG tablet Take 3 tablets (60 mg) by mouth daily for 4 days, Disp-12 tablet, R-0, E-Prescribe                 Darien Blair MD  08/24/23 0601

## 2023-08-24 NOTE — ED TRIAGE NOTES
Patient presents with diffuse hives, swelling to lips, and mild pain in throat that started this morning.  She was recently prescribed amoxicillin for a dental infection.  After finishing the antibiotic, she still had discomfort in her tooth, so last night she started taking an old prescription of cephalexin.  This morning she woke with hives that have continued to worsen throughout the day.  She took 50 mg of Benadryl at 2130.  She also used a topical benadryl cream. She denies swelling inside mouth, on tongue, or in throat.  She denies SOB or chest pain.  She denies hx of similar reactions.     Triage Assessment       Row Name 08/24/23 0011       Triage Assessment (Adult)    Airway WDL WDL       Respiratory WDL    Respiratory WDL WDL       Skin Circulation/Temperature WDL    Skin Circulation/Temperature WDL WDL       Cardiac WDL    Cardiac WDL WDL       Peripheral/Neurovascular WDL    Peripheral Neurovascular WDL WDL       Cognitive/Neuro/Behavioral WDL    Cognitive/Neuro/Behavioral WDL WDL

## 2023-09-17 ENCOUNTER — HOSPITAL ENCOUNTER (EMERGENCY)
Facility: CLINIC | Age: 63
Discharge: HOME | End: 2023-09-17
Payer: COMMERCIAL

## 2023-09-17 VITALS
RESPIRATION RATE: 14 BRPM | WEIGHT: 180 LBS | SYSTOLIC BLOOD PRESSURE: 124 MMHG | BODY MASS INDEX: 33.13 KG/M2 | HEIGHT: 62 IN | TEMPERATURE: 98.7 F | OXYGEN SATURATION: 94 % | DIASTOLIC BLOOD PRESSURE: 67 MMHG | HEART RATE: 98 BPM

## 2023-09-17 DIAGNOSIS — T78.40XA ALLERGIC REACTION, INITIAL ENCOUNTER: ICD-10-CM

## 2023-09-17 DIAGNOSIS — L50.9 HIVES: ICD-10-CM

## 2023-09-17 PROCEDURE — 250N000013 HC RX MED GY IP 250 OP 250 PS 637

## 2023-09-17 PROCEDURE — 250N000011 HC RX IP 250 OP 636: Mod: JZ

## 2023-09-17 PROCEDURE — 96374 THER/PROPH/DIAG INJ IV PUSH: CPT

## 2023-09-17 PROCEDURE — 99284 EMERGENCY DEPT VISIT MOD MDM: CPT

## 2023-09-17 PROCEDURE — 250N000012 HC RX MED GY IP 250 OP 636 PS 637

## 2023-09-17 PROCEDURE — 99284 EMERGENCY DEPT VISIT MOD MDM: CPT | Mod: 25

## 2023-09-17 RX ORDER — DIPHENHYDRAMINE HCL 25 MG
50 CAPSULE ORAL ONCE
Status: COMPLETED | OUTPATIENT
Start: 2023-09-17 | End: 2023-09-17

## 2023-09-17 RX ORDER — CETIRIZINE HYDROCHLORIDE 10 MG/1
10 TABLET ORAL DAILY
Qty: 30 TABLET | Refills: 0 | Status: SHIPPED | OUTPATIENT
Start: 2023-09-17 | End: 2023-10-17

## 2023-09-17 RX ORDER — PREDNISONE 20 MG/1
60 TABLET ORAL ONCE
Status: COMPLETED | OUTPATIENT
Start: 2023-09-17 | End: 2023-09-17

## 2023-09-17 RX ORDER — EPINEPHRINE 0.3 MG/.3ML
0.3 INJECTION SUBCUTANEOUS
Qty: 2 EACH | Refills: 0 | Status: SHIPPED | OUTPATIENT
Start: 2023-09-17 | End: 2023-10-03

## 2023-09-17 RX ORDER — PREDNISONE 20 MG/1
40 TABLET ORAL DAILY
Qty: 10 TABLET | Refills: 0 | Status: SHIPPED | OUTPATIENT
Start: 2023-09-17 | End: 2023-09-22

## 2023-09-17 RX ADMIN — DIPHENHYDRAMINE HYDROCHLORIDE 50 MG: 25 CAPSULE ORAL at 20:17

## 2023-09-17 RX ADMIN — FAMOTIDINE 20 MG: 10 INJECTION INTRAVENOUS at 20:19

## 2023-09-17 RX ADMIN — PREDNISONE 60 MG: 20 TABLET ORAL at 20:17

## 2023-09-17 ASSESSMENT — ACTIVITIES OF DAILY LIVING (ADL): ADLS_ACUITY_SCORE: 35

## 2023-09-18 NOTE — ED TRIAGE NOTES
Thinks she is having a rxn to amoxicillan. Hands swelling, sob. Took epi pen at 1920. Voice raspy

## 2023-09-18 NOTE — ED PROVIDER NOTES
"     Emergency Department Patient Sign-out       Brief HPI:  This is a 62 year old female signed out to me by Dr. Lillian Barakat .  See initial ED Provider note for details of the presentation.            Significant Events prior to my assuming care: none      Exam:   Patient Vitals for the past 24 hrs:   BP Temp Temp src Pulse Resp SpO2 Height Weight   09/17/23 2145 124/67 -- -- 98 14 94 % -- --   09/17/23 2131 -- -- -- -- 16 94 % -- --   09/17/23 2130 127/65 -- -- 97 -- -- -- --   09/17/23 2115 131/74 -- -- 92 10 93 % -- --   09/17/23 2100 132/70 -- -- 92 -- -- -- --   09/17/23 2045 (!) 145/80 -- -- 86 10 94 % -- --   09/17/23 1949 (!) 154/81 -- -- 98 17 -- -- --   09/17/23 1943 (!) 185/80 98.7  F (37.1  C) Tympanic 90 20 98 % 1.575 m (5' 2\") 81.6 kg (180 lb)           ED RESULTS:   No results found for this visit on 09/17/23 (from the past 24 hour(s)).    ED MEDICATIONS:   Medications   famotidine (PEPCID) injection 20 mg (20 mg Intravenous $Given 9/17/23 2019)   diphenhydrAMINE (BENADRYL) capsule 50 mg (50 mg Oral $Given 9/17/23 2017)   predniSONE (DELTASONE) tablet 60 mg (60 mg Oral $Given 9/17/23 2017)         Impression:    ICD-10-CM    1. Allergic reaction, initial encounter  T78.40XA       2. Hives  L50.9           Plan:    Pending studies include none. Plan for re-assessment around 10pm.       10:04 PM Patient re-assessed: Patient resting comfortably.  Symptoms have stabilized.  Patient reports return of the sensation in her right hand.  Still feeling swollen in the hand and some discomfort.  No hives currently.  No difficulty breathing or swallowing.  No nausea or diarrhea.  Patient appears well and in no distress.  No current evidence of severe reaction.  Counseled patient to continue with the treatment plan with prednisone and antihistamines with return precautions.  Plan to avoid amoxicillin in the future      MD Yuval Long, Rick Anderson MD  09/17/23 4738    "

## 2023-09-18 NOTE — ED PROVIDER NOTES
"  History     Chief Complaint   Patient presents with    Allergic Reaction     HPI  Kareen Helton is a 62 year old female who p/w diffuse swelling, worse of b/l hands, and intermittent hives x 4 days since completing antibiotic therapy, amoxicillin after root canal.  Pt used her epi pen @ 1930 this evening PTA after noting the swelling worsening causing numbness of the RIGHT 2-4th digits.  She had a prior episode of similar symptoms after amoxicillin last month.  States she completed a course of amoxicillin last month, didn't feel better and then took a few tablets of left over antibiotic \"cef--\".  The following day she noted hives and generalized swelling that progressed to include the LEFT side of her face.  She presented to an ED then and was treated for anaphylaxis.  Then 3 weeks ago she had dental work and was again put on amoxicillin on 9/8. She completed this course on 9/14 and started with hives that evening which progressed to diffuse pruritus and generalized edema, worse of hands despite taking benadryl. Notable Fhx PCN allg in siblings.      Allergies:  Allergies   Allergen Reactions    Amoxicillin Hives, Itching and Swelling       Problem List:    Patient Active Problem List    Diagnosis Date Noted    Recurrent major depression in complete remission (H) 06/06/2023     Priority: Medium    Essential hypertension 06/06/2023     Priority: Medium    Family history of colon cancer 06/06/2023     Priority: Medium     Father      Morbid obesity (H) 06/06/2023     Priority: Medium        Past Medical History:    Past Medical History:   Diagnosis Date    Essential hypertension 6/6/2023       Past Surgical History:    No past surgical history on file.    Family History:    Family History   Problem Relation Age of Onset    Hypertension Mother     Hyperlipidemia Mother     Emphysema Mother     Colorectal Cancer Father     Cerebrovascular Disease Father     Colon Cancer Father     Hypertension Maternal Grandmother     " "Hyperlipidemia Maternal Grandmother     Cerebrovascular Disease Maternal Grandmother     Emphysema Paternal Grandfather     Hypertension Brother     Hypertension Sister     Hyperlipidemia Sister     Asthma Son        Social History:  Marital Status:  Single [1]  Social History     Tobacco Use    Smoking status: Former     Types: Cigarettes    Smokeless tobacco: Never    Tobacco comments:     I smoked between the ages of 17 and 24   Vaping Use    Vaping Use: Never used   Substance Use Topics    Alcohol use: Yes     Comment: 5 per week    Drug use: Never        Medications:    cetirizine (ZYRTEC) 10 MG tablet  EPINEPHrine (ANY BX GENERIC EQUIV) 0.3 MG/0.3ML injection 2-pack  predniSONE (DELTASONE) 20 MG tablet  atenolol (TENORMIN) 25 MG tablet  EPINEPHrine (ANY BX GENERIC EQUIV) 0.3 MG/0.3ML injection 2-pack  venlafaxine (EFFEXOR) 37.5 MG tablet          Review of Systems  Pertinent negatives and positives as mentioned in HPI above, otherwise focused ROS was negative.        Physical Exam   BP: (!) 185/80  Pulse: 90  Temp: 98.7  F (37.1  C)  Resp: 20  Height: 157.5 cm (5' 2\")  Weight: 81.6 kg (180 lb)  SpO2: 98 %      Physical Exam  Vitals reviewed.   Constitutional:       General: She is not in acute distress.     Appearance: She is not diaphoretic.   HENT:      Head: Atraumatic.   Eyes:      Extraocular Movements: Extraocular movements intact.      Conjunctiva/sclera: Conjunctivae normal.      Pupils: Pupils are equal, round, and reactive to light.   Cardiovascular:      Rate and Rhythm: Normal rate and regular rhythm.   Pulmonary:      Effort: Pulmonary effort is normal. No respiratory distress.      Breath sounds: Normal breath sounds. No stridor. No wheezing.   Skin:     General: Skin is warm and dry.      Capillary Refill: Capillary refill takes less than 2 seconds.      Findings: No erythema or rash.   Neurological:      General: No focal deficit present.      Mental Status: She is alert.      Sensory: No " sensory deficit.      Motor: No weakness.         ED Course            No results found for this or any previous visit (from the past 24 hour(s)).    Medications   famotidine (PEPCID) injection 20 mg (20 mg Intravenous $Given 9/17/23 2019)   diphenhydrAMINE (BENADRYL) capsule 50 mg (50 mg Oral $Given 9/17/23 2017)   predniSONE (DELTASONE) tablet 60 mg (60 mg Oral $Given 9/17/23 2017)       Assessments & Plan (with Medical Decision Making)  62yF presents with generalized edema, itching, and endorsed urticaria after completing a second course of amoxicillin having finished 4 days ago at initial onset of symptoms. Epi pen delivered PTA 1930 and she presented hemodynamically stable with normal WOB, clear lungs, resolved rash, and mild swelling of the hands bilaterally with intact cap refill and normal sensation.  Considered possibility of angioedema however no facial swelling on arrival, no new medications and pt is not on ACE inhibitor.  Adjuvant prednisone, pepcid, benadryl given.  Recommend 3 hrs observation post epinephrine for biphasic reaction risk.  Allergies updated to reflect amoxicillin at this time.  Patient signed out to TITUS Barakat MD for continued observation prior to probable discharge.      I have reviewed the nursing notes.    I have reviewed the findings, diagnosis, plan and need for follow up with the patient.     Medical Decision Making  The patient's presentation was of moderate complexity (an acute illness with systemic symptoms).    The patient's evaluation involved:  discussion of management or test interpretation with another health professional (consulted with physician colleague)    The patient's management necessitated moderate risk (prescription drug management including medications given in the ED) and further care after sign-out to TITUS Barakat MD (see their note for further management).        New Prescriptions    CETIRIZINE (ZYRTEC) 10 MG TABLET    Take 1 tablet (10 mg) by mouth daily for 30 days     EPINEPHRINE (ANY BX GENERIC EQUIV) 0.3 MG/0.3ML INJECTION 2-PACK    Inject 0.3 mLs (0.3 mg) into the muscle once as needed for anaphylaxis May repeat one time in 5-15 minutes if response to initial dose is inadequate.    PREDNISONE (DELTASONE) 20 MG TABLET    Take 2 tablets (40 mg) by mouth daily for 5 days Take two tablets (= 40mg) each day for 5 (five) days       Final diagnoses:   Allergic reaction, initial encounter   Hiv       9/17/2023   [facility] EMERGENCY DEPT       Barakat, VALERIA Snyder CNP  09/17/23 2510

## 2023-09-18 NOTE — DISCHARGE INSTRUCTIONS
Take 40mg pepcid once per day for the next 4 days.    Take prednisone as prescribed.    Take cetirizine as prescribed, once per day in the morning.    At bedtime you can take benadryl 50mg for severe itching.     Return to the ER if you experience swelling of the face/neck/mouth, difficulty talking, swallowing, hoarseness.

## 2023-09-20 ENCOUNTER — HOSPITAL ENCOUNTER (EMERGENCY)
Facility: CLINIC | Age: 63
Discharge: HOME | End: 2023-09-20
Attending: NURSE PRACTITIONER | Admitting: NURSE PRACTITIONER
Payer: COMMERCIAL

## 2023-09-20 VITALS
OXYGEN SATURATION: 97 % | TEMPERATURE: 98.3 F | RESPIRATION RATE: 18 BRPM | HEART RATE: 67 BPM | SYSTOLIC BLOOD PRESSURE: 158 MMHG | DIASTOLIC BLOOD PRESSURE: 87 MMHG

## 2023-09-20 DIAGNOSIS — M25.50 MULTIPLE JOINT PAIN: ICD-10-CM

## 2023-09-20 PROCEDURE — G0463 HOSPITAL OUTPT CLINIC VISIT: HCPCS | Performed by: NURSE PRACTITIONER

## 2023-09-20 PROCEDURE — 99213 OFFICE O/P EST LOW 20 MIN: CPT | Performed by: NURSE PRACTITIONER

## 2023-09-20 RX ORDER — FAMOTIDINE 40 MG/1
40 TABLET, FILM COATED ORAL DAILY
Qty: 10 TABLET | Refills: 0 | Status: SHIPPED | OUTPATIENT
Start: 2023-09-20 | End: 2023-09-30

## 2023-09-20 RX ORDER — NAPROXEN 500 MG/1
500 TABLET ORAL 2 TIMES DAILY WITH MEALS
Qty: 30 TABLET | Refills: 0 | Status: SHIPPED | OUTPATIENT
Start: 2023-09-20 | End: 2023-10-05

## 2023-09-20 ASSESSMENT — ACTIVITIES OF DAILY LIVING (ADL): ADLS_ACUITY_SCORE: 35

## 2023-09-20 NOTE — ED TRIAGE NOTES
Pt reports allergic reaction to amoxicillin and was in ER on 9/17/23  Pt states she has been on prednisone since 9/17/23 and has been having joint pain and back spasms since

## 2023-09-20 NOTE — ED PROVIDER NOTES
ED Provider Note  [facility]      History     Chief Complaint   Patient presents with    Joint Pain     HPI  Kareen Helton is a 62 year old female who presents with ongoing hand and wrist and forearm pain bilateral.  Reports that this began after having an anaphylactic reaction which she was seen in the emergency room for.  She is currently taking Zyrtec, famotidine, and prescribed prednisone taper.  Denies any history of arthritis, musculoskeletal complaints or known injuries.  Denies fever, chest pain, shortness of breath, change in urination pattern.  She was told at her last emergency room visit that she potentially would have ongoing reaction to taking amoxicillin and she continues to still be taking the prednisone taper that was ordered in the emergency room.  Denies any known injury or trauma to her extremities.            Allergies:  Allergies   Allergen Reactions    Amoxicillin Hives, Itching and Swelling       Problem List:    Patient Active Problem List    Diagnosis Date Noted    Recurrent major depression in complete remission (H) 06/06/2023     Priority: Medium    Essential hypertension 06/06/2023     Priority: Medium    Family history of colon cancer 06/06/2023     Priority: Medium     Father      Morbid obesity (H) 06/06/2023     Priority: Medium        Past Medical History:    Past Medical History:   Diagnosis Date    Essential hypertension 6/6/2023       Past Surgical History:    No past surgical history on file.    Family History:    Family History   Problem Relation Age of Onset    Hypertension Mother     Hyperlipidemia Mother     Emphysema Mother     Colorectal Cancer Father     Cerebrovascular Disease Father     Colon Cancer Father     Hypertension Maternal Grandmother     Hyperlipidemia Maternal Grandmother     Cerebrovascular Disease Maternal Grandmother     Emphysema Paternal Grandfather     Hypertension Brother     Hypertension Sister     Hyperlipidemia Sister      Asthma Son        Social History:  Marital Status:  Single [1]  Social History     Tobacco Use    Smoking status: Former     Types: Cigarettes    Smokeless tobacco: Never    Tobacco comments:     I smoked between the ages of 17 and 24   Vaping Use    Vaping Use: Never used   Substance Use Topics    Alcohol use: Yes     Comment: 5 per week    Drug use: Never        Medications:    atenolol (TENORMIN) 25 MG tablet  famotidine (PEPCID) 40 MG tablet  naproxen (NAPROSYN) 500 MG tablet  predniSONE (DELTASONE) 20 MG tablet  venlafaxine (EFFEXOR) 37.5 MG tablet  cetirizine (ZYRTEC) 10 MG tablet  EPINEPHrine (ANY BX GENERIC EQUIV) 0.3 MG/0.3ML injection 2-pack  EPINEPHrine (ANY BX GENERIC EQUIV) 0.3 MG/0.3ML injection 2-pack          Review of Systems  A medically appropriate review of systems was performed with pertinent positives and negatives noted in the HPI, and all other systems negative.    Physical Exam   Patient Vitals for the past 24 hrs:   BP Temp Temp src Pulse Resp SpO2   09/20/23 1212 (!) 158/87 98.3  F (36.8  C) Tympanic 67 18 97 %          Physical Exam  General: alert and in no acute distress on arrival  Ears/Nose/Throat: Normal bilateral ear exams, nose and throat are within normal limits no edema, redness or alterations.  Head: atraumatic, normocephalic  Lungs:  nonlabored, CTA bilateral throughout  CV:  extremities warm and perfused, no edema in lower extremities no change in sensation.  Abd: nondistended, no HSM guarding or tenderness.  Skin: no rashes, no diaphoresis and skin color normal  Neuro: Patient awake, alert, speech is fluent,   Psychiatric: affect/mood normal, very pleasant.      ED Course                 Procedures                     No results found for this or any previous visit (from the past 24 hour(s)).    MEDICATIONS GIVEN IN THE EMERGENCY DEPARTMENT:  Medications - No data to display             Assessments & Plan (with Medical Decision Making)  62 year old female who presents to the  Urgent Care for evaluation of bilateral upper extremity joint pain     I believe this to be sequela following allergic reaction which she was seen for in the emergency room and given prednisone and antihistamines.  I have ordered famotidine 40 mg once daily, to continue daily Zyrtec, and to finish the prednisone taper that has been ordered from the emergency room.  I have ordered rheumatology consult if her symptoms are not improving within the next few days.  She should return to us or her primary provider if symptoms worsen despite her recommended treatment.  I have reviewed the nursing notes.    I have reviewed the findings, diagnosis, plan and need for follow up with the patient.        NEW PRESCRIPTIONS STARTED AT TODAY'S ER VISIT  New Prescriptions    FAMOTIDINE (PEPCID) 40 MG TABLET    Take 1 tablet (40 mg) by mouth daily for 10 days    NAPROXEN (NAPROSYN) 500 MG TABLET    Take 1 tablet (500 mg) by mouth 2 times daily (with meals) for 15 days       Final diagnoses:   Multiple joint pain       9/20/2023   [facility] EMERGENCY DEPT       Alix Anderson, VALERIA CNP  09/20/23 1827

## 2023-10-02 ASSESSMENT — PATIENT HEALTH QUESTIONNAIRE - PHQ9
SUM OF ALL RESPONSES TO PHQ QUESTIONS 1-9: 0
10. IF YOU CHECKED OFF ANY PROBLEMS, HOW DIFFICULT HAVE THESE PROBLEMS MADE IT FOR YOU TO DO YOUR WORK, TAKE CARE OF THINGS AT HOME, OR GET ALONG WITH OTHER PEOPLE: NOT DIFFICULT AT ALL
SUM OF ALL RESPONSES TO PHQ QUESTIONS 1-9: 0

## 2023-10-03 ENCOUNTER — OFFICE VISIT (OUTPATIENT)
Dept: FAMILY MEDICINE | Facility: CLINIC | Age: 63
End: 2023-10-03
Payer: COMMERCIAL

## 2023-10-03 VITALS
WEIGHT: 201.8 LBS | DIASTOLIC BLOOD PRESSURE: 90 MMHG | HEIGHT: 63 IN | HEART RATE: 66 BPM | OXYGEN SATURATION: 96 % | BODY MASS INDEX: 35.75 KG/M2 | TEMPERATURE: 98.1 F | SYSTOLIC BLOOD PRESSURE: 130 MMHG | RESPIRATION RATE: 20 BRPM

## 2023-10-03 DIAGNOSIS — T78.40XD ALLERGIC REACTION, SUBSEQUENT ENCOUNTER: ICD-10-CM

## 2023-10-03 DIAGNOSIS — Z12.11 SCREEN FOR COLON CANCER: ICD-10-CM

## 2023-10-03 DIAGNOSIS — R20.0 NUMBNESS OF FINGERS OF BOTH HANDS: Primary | ICD-10-CM

## 2023-10-03 PROCEDURE — 99214 OFFICE O/P EST MOD 30 MIN: CPT | Performed by: FAMILY MEDICINE

## 2023-10-03 ASSESSMENT — ENCOUNTER SYMPTOMS
GASTROINTESTINAL NEGATIVE: 1
ENDOCRINE NEGATIVE: 1
PARESTHESIAS: 1
EYES NEGATIVE: 1
CONSTITUTIONAL NEGATIVE: 1
NUMBNESS: 1
HEMATOLOGIC/LYMPHATIC NEGATIVE: 1
PSYCHIATRIC NEGATIVE: 1
CARDIOVASCULAR NEGATIVE: 1
RESPIRATORY NEGATIVE: 1
ALLERGIC/IMMUNOLOGIC NEGATIVE: 1
MUSCULOSKELETAL NEGATIVE: 1

## 2023-10-03 ASSESSMENT — PAIN SCALES - GENERAL: PAINLEVEL: NO PAIN (0)

## 2023-10-03 NOTE — PROGRESS NOTES
"  Assessment & Plan     Numbness of fingers of both hands  Recommend wearing a brace, recommend EMG  - EMG    Screen for colon cancer  Patient has this scheduled early in Nov    Allergic reaction, subsequent encounter  Patient doing much better              MED REC REQUIRED  Post Medication Reconciliation Status: discharge medications reconciled, continue medications without change  BMI:   Estimated body mass index is 36.03 kg/m  as calculated from the following:    Height as of this encounter: 1.594 m (5' 2.75\").    Weight as of this encounter: 91.5 kg (201 lb 12.8 oz).       FUTURE APPOINTMENTS:       - Follow-up visit in one month or sooner as needed.    Arthur Barker MD  [facility]    Palmer Higgins is a 62 year old, presenting for the following health issues:      Chief Complaint   Patient presents with    ER F/U     FOLLOW UP FROM ER VISITS 8/24,9/17,9/20           10/3/2023    10:07 AM   Additional Questions   Roomed by RACHID POON CMA   Accompanied by SELF   Benadryl 25mg capsule as needed     HPI   Patient is a 62 yr old female here for ER follow up. She was seen for an allergic reaction likely to amoxicillin. She was given an epi pen and also prednisone at the time of discharge. She reports that most of her symptoms have resolved except that she is having some joint pain and she is also having numbness on her right hand. She says she sometimes needs to flick her hands in other to get some feeling back. She says she uses her hands a lot and she does a lot of typing.       ED/UC Followup:    Facility:  [facility]   Date of visit: 8/24/23,9/17/23,9/20/23  Reason for visit: allergic reaction, uitcaria  Current Status: joint pains and ulnar nerve symptoms in Right arm . No more hives and itching         Review of Systems   Constitutional: Negative.    Eyes: Negative.    Respiratory: Negative.     Cardiovascular: Negative.    Gastrointestinal: Negative.  " "  Endocrine: Negative.    Breasts:  negative.    Genitourinary: Negative.    Musculoskeletal: Negative.    Allergic/Immunologic: Negative.    Neurological:  Positive for numbness and paresthesias.   Hematological: Negative.    Psychiatric/Behavioral: Negative.              Objective    BP (!) 132/94 (BP Location: Right arm, Patient Position: Sitting, Cuff Size: Adult Regular)   Pulse 66   Temp 98.1  F (36.7  C) (Tympanic)   Resp 20   Ht 1.594 m (5' 2.75\")   Wt 91.5 kg (201 lb 12.8 oz)   SpO2 96%   BMI 36.03 kg/m    Body mass index is 36.03 kg/m .  Physical Exam  Constitutional:       Appearance: Normal appearance.   HENT:      Head: Normocephalic and atraumatic.   Cardiovascular:      Rate and Rhythm: Normal rate and regular rhythm.   Pulmonary:      Effort: Pulmonary effort is normal.      Breath sounds: Normal breath sounds.   Abdominal:      General: Abdomen is flat. Bowel sounds are normal.      Palpations: Abdomen is soft.   Musculoskeletal:         General: Tenderness present. Normal range of motion.      Right wrist: Tenderness present.      Comments: Tinel test positive    Skin:     General: Skin is warm and dry.   Neurological:      Mental Status: She is alert and oriented to person, place, and time.   Psychiatric:         Mood and Affect: Mood normal.         Behavior: Behavior normal.                 Answers submitted by the patient for this visit:  Patient Health Questionnaire (Submitted on 10/2/2023)  If you checked off any problems, how difficult have these problems made it for you to do your work, take care of things at home, or get along with other people?: Not difficult at all  PHQ9 TOTAL SCORE: 0    "

## 2023-10-13 ENCOUNTER — MYC MEDICAL ADVICE (OUTPATIENT)
Dept: FAMILY MEDICINE | Facility: CLINIC | Age: 63
End: 2023-10-13
Payer: COMMERCIAL

## 2023-10-15 NOTE — TELEPHONE ENCOUNTER
[facility] attending physician statement form printed and routed to Dr. Barker for review.

## 2023-10-16 ENCOUNTER — ALLIED HEALTH/NURSE VISIT (OUTPATIENT)
Dept: FAMILY MEDICINE | Facility: CLINIC | Age: 63
End: 2023-10-16
Payer: COMMERCIAL

## 2023-10-16 ENCOUNTER — TELEPHONE (OUTPATIENT)
Dept: FAMILY MEDICINE | Facility: CLINIC | Age: 63
End: 2023-10-16

## 2023-10-16 VITALS — SYSTOLIC BLOOD PRESSURE: 136 MMHG | HEART RATE: 76 BPM | DIASTOLIC BLOOD PRESSURE: 92 MMHG

## 2023-10-16 DIAGNOSIS — I10 ESSENTIAL HYPERTENSION: Primary | ICD-10-CM

## 2023-10-16 PROCEDURE — 99207 PR NO CHARGE NURSE ONLY: CPT

## 2023-10-16 NOTE — TELEPHONE ENCOUNTER
Patient returns our call and she prefers to lower her bp by diet and exercise. Yaritza CONNOLLY RN

## 2023-10-16 NOTE — TELEPHONE ENCOUNTER
Patient's blood pressure noted.Diastolic is still a bit high. If she does not want to start medication advice weight loss .   SUBJECTIVE:                                                            Preston Cai is a 82 year old male who presents for Preventive Visit.    Are you in the first 12 months of your Medicare coverage?  No    Physical   Annual:     Getting at least 3 servings of Calcium per day::  Yes    Bi-annual eye exam::  Yes    Dental care twice a year::  NO    Sleep apnea or symptoms of sleep apnea::  Daytime drowsiness    Diet::  Regular (no restrictions)    Frequency of exercise::  2-3 days/week    Duration of exercise::  15-30 minutes    Taking medications regularly::  Yes    Medication side effects::  None    Additional concerns today::  YES      COGNITIVE SCREEN  1) Repeat 3 items (Banana, Sunrise, Chair)    2) Clock draw: NORMAL  3) 3 item recall: Recalls 3 objects  Results: 3 items recalled: COGNITIVE IMPAIRMENT LESS LIKELY                                                            Gerd/Heartburn  Duration of complaint: chronic   Description of symptoms:  Worse when laying supine on right side. Hiatal hernia noted from EGD last 7/17/2007. Recommendations include lifelong antireflux regimen.  food getting stuck: no   Globus sensation: Yes  nausea/vomiting: no   abdominal pain: no   black/tarry or bloody stools: no :  worse with no particular food or drink.  current NSAID/Aspirin use: no   Therapies tried and outcome: Omeprazole (Prilosec) is not effective. Aciphex started few weeks before clinic visit, is modestly effective.    Mini-CogTM Copyright NEENA Jimenez. Licensed by the author for use in [facility]; reprinted with permission (wililan@.[facility]). All rights reserved.        All Histories reviewed and updated in EPIC as appropriate.  Social History   Substance Use Topics     Smoking status: Former Smoker     Smokeless tobacco: Never Used      Comment: 1967     Alcohol use 0.0 oz/week     0 Standard drinks or equivalent per week      Comment: A beer once in awhile       The patient does not drink >3 drinks  per day nor >7 drinks per week.        -------------------------------------    Today's PHQ-2 Score:   PHQ-2 ( 1999 Pf[employer]) 2/17/2017   Little interest or pleasure in doing things Not at all   Feeling down, depressed or hopeless Not at all   PHQ-2 Score 0       Do you feel safe in your environment - Yes    Do you have a Health Care Directive?: No: Advance care planning reviewed with patient; information given to patient to review.    Current providers sharing in care for this patient include:   Patient Care Team:  Tyrel Manning MD as PCP - General (Internal Medicine)      Hearing impairment: No    Ability to successfully perform activities of daily living: Yes, no assistance needed     Fall risk:  Fallen 2 or more times in the past year?: No  Any fall with injury in the past year?: No    Home safety:  none identified  click delete button to remove this line now    The following health maintenance items are reviewed in Epic and correct as of today:  Health Maintenance   Topic Date Due     FALL RISK ASSESSMENT  06/09/2017     EYE EXAM Q1 YEAR( NO INBASKET)  07/19/2017     ADVANCE DIRECTIVE PLANNING Q5 YRS (NO INBASKET)  03/20/2019     TETANUS IMMUNIZATION (SYSTEM ASSIGNED)  03/07/2022     INFLUENZA VACCINE (SYSTEM ASSIGNED)  Completed     PNEUMOCOCCAL  Completed              ROS:  CONSTITUTIONAL:POSITIVE  for chronic fatigue and NEGATIVE  for anorexia, malaise, myalgias and sweats  I: NEGATIVE for worrisome rashes, moles or lesions  E: NEGATIVE for vision changes or irritation  E/M: NEGATIVE for ear, mouth and throat problems  R: NEGATIVE for significant cough or SOB  B: NEGATIVE for masses, tenderness or discharge  CV: NEGATIVE for chest pain, palpitations or peripheral edema  GI: NEGATIVE for nausea, abdominal pain or change in bowel habits  : NEGATIVE for frequency, dysuria, or hematuria  M: NEGATIVE for significant arthralgias or myalgia  N: NEGATIVE for weakness, dizziness or paresthesias  ENDOCRINE:  NEGATIVE for cold intolerance and HX thyroid disease  H: NEGATIVE for bleeding problems  P: NEGATIVE for changes in mood or affect  Naps in the afternoon for fatigue.  No concerns for memory  Tremors are same  Globus sensation when lying at right side.    Problem list, Medication list, Allergies, and Medical/Social/Surgical histories reviewed in [facility] and updated as appropriate.  Labs reviewed in EPIC  BP Readings from Last 3 Encounters:   02/20/17 102/65   10/10/16 102/67   09/22/16 107/67    Wt Readings from Last 3 Encounters:   02/20/17 181 lb (82.1 kg)   10/10/16 182 lb (82.6 kg)   09/22/16 182 lb 3 oz (82.6 kg)                  Patient Active Problem List   Diagnosis     Seborrheic dermatitis     Allergic rhinitis due to other allergen     Cervical radiculopathy at C6     Laryngeal cancer (H)     Essential tremor     CARDIOVASCULAR SCREENING; LDL GOAL LESS THAN 160     BPH (benign prostatic hypertrophy)     Hoarse voice quality     Health Care Home     Advanced directives, counseling/discussion     History of anemia     Cataract     Pseudophakia     Gastroesophageal reflux disease without esophagitis     BPH without urinary obstruction     Globus sensation     Pernicious anemia     Hypocalcemia     Tremor     H/O magnetic resonance imaging of brain and brain stem     Macular degeneration     Squamous blepharitis of upper and lower eyelids of both eyes     Past Surgical History   Procedure Laterality Date     Appendectomy  9 years old     Nasal endoscopy,dx  4/2007     GERD     Cystoscopy  1997     for hematuria - negative     Gastroscopy,fl  9/2007     hiatal hernia and errosions     Phacoemulsification with intraocular lens implantation right eye.  3/11/2003     Pars plana vitrectomy and epiretinal membrane dissection, right eye  11/8/2002     Biopsy of the throat - cancerous mass - got radiation for in larynx  1982     Thyroglossal duct cyst removal - 2ndary to radiation.  1982     Vasectomy  1971      Colonoscopy  2/25/2004     Normal     Eye surgery Right      Rt eye.macular repair     Colonoscopy N/A 4/28/2015     Procedure: COLONOSCOPY;  Surgeon: Marvin Adams MD;  Location: [facility] OR     Colonoscopy with co2 insufflation N/A 4/28/2015     Procedure: COLONOSCOPY WITH CO2 INSUFFLATION;  Surgeon: Marvin Adams MD;  Location: [facility] OR     Phacoemulsification with standard intraocular lens implant Left 9/22/2016     Procedure: PHACOEMULSIFICATION WITH STANDARD INTRAOCULAR LENS IMPLANT;  Surgeon: Nghia Lara MD;  Location: [facility] OR     Eye surgery  2003     cataract     Cataract iol, rt/lt         Social History   Substance Use Topics     Smoking status: Former Smoker     Smokeless tobacco: Never Used      Comment: 1969     Alcohol use 0.0 oz/week     0 Standard drinks or equivalent per week      Comment: A beer once in awhile     Family History   Problem Relation Age of Onset     CEREBROVASCULAR DISEASE Mother      at 84 yo - possibly TIA - befoer     Macular Degeneration Mother      GASTROINTESTINAL DISEASE Father      Cancer - colorectal Father      Hypertension Father      CANCER Father      CANCER Other      C.A.D. No family hx of      DIABETES No family hx of      Prostate Cancer No family hx of      Glaucoma No family hx of      Thyroid Disease No family hx of          Allergies   Allergen Reactions     Seasonal Allergies      Hay fever      Recent Labs   Lab Test  02/20/17   0925  09/06/16   0944   06/09/16   1056  02/03/16   0937  03/11/15   0946   02/27/13   1319   A1C   --    --    --    --    --    --    --   5.3   LDL  121*   --    --    --   113*  115   < >   --    HDL  69   --    --    --   71  65   < >   --    TRIG  104   --    --    --   79  96   < >   --    ALT  22  24   --    --   22   --    --    --    CR  1.06  1.09   --    --   1.00   --    --    --    GFRESTIMATED  67  65   < >   --   72   --    --    --    GFRESTBLACK  81  79   < >   --   87   --    --    --   "  POTASSIUM  4.3  4.1   --    --   4.4   --    --    --    TSH   --    --    --   3.21  2.53   --    --    --     < > = values in this interval not displayed.      OBJECTIVE:                                                            /65 (BP Location: Left arm, Patient Position: Chair, Cuff Size: Adult Regular)  Pulse 165  Temp 98  F (36.7  C) (Oral)  Ht 6' 1\" (1.854 m)  Wt 181 lb (82.1 kg)  SpO2 96%  BMI 23.88 kg/m2  EXAM:   GENERAL: healthy, alert and no distress  EYES: Eyes grossly normal to inspection  RESP: lungs clear to auscultation - no rales, rhonchi or wheezes  CV: regular rate and rhythm, normal S1 S2, no S3 or S4, no murmur, click or rub, no peripheral edema and peripheral pulses strong  ABDOMEN: soft, nontender, no hepatosplenomegaly, no masses and bowel sounds normal  MS: no gross musculoskeletal defects noted, no edema  SKIN: no suspicious lesions or rashes  NEURO: Normal strength and tone, mentation intact and speech normal  PSYCH: mentation appears normal, affect normal/bright    ASSESSMENT / PLAN:                                                            (Z00.00) Routine general medical examination at a health care facility  (primary encounter diagnosis)  Comment: Intermediate risk of heart disease due to advanced age and gender.  Plan: Comprehensive metabolic panel (BMP + Alb, Alk         Phos, ALT, AST, Total. Bili, TP), CBC with         platelets and differential            (K21.9) Gastroesophageal reflux disease without esophagitis  Comment: I recommend an esophagram to determine severity of symptoms. Ideal PPI is either Dexlansoprazole or Esomeprazole due to better oral availability and longer duration of action.  Plan: XR Esophagram w Upper GI            (F45.8) Globus sensation  Comment: Rationale of esophagram explained to Mr. Cai. Continue Rabeprazole for now.  Plan: XR Esophagram w Upper GI            (E55.9) Vitamin D deficiency  Comment: Currently on cholecalciferol 2000 IU " "once daily.  Plan: Vitamin D Deficiency            (D51.0) Pernicious anemia  Comment: Positive parietal cell antibody IgG test last 6/9/16.  Plan: Intramuscular  Vitamin B12 monthly (lifelong)            (Z13.6) CARDIOVASCULAR SCREENING; LDL GOAL LESS THAN 160  Comment: Treat accordingly if abnormal.  Plan: Lipid Profile              End of Life Planning:  Patient currently has an advanced directive: Yes.  Practitioner is supportive of decision.    COUNSELING:  Special attention given to:       Regular exercise       Healthy diet/nutrition        Estimated body mass index is 24.01 kg/(m^2) as calculated from the following:    Height as of 10/10/16: 6' 1\" (1.854 m).    Weight as of 10/10/16: 182 lb (82.6 kg).     reports that he has quit smoking. He has never used smokeless tobacco.      Appropriate preventive services were discussed with this patient, including applicable screening as appropriate for cardiovascular disease, diabetes, osteopenia/osteoporosis, and glaucoma.  As appropriate for age/gender, discussed screening for colorectal cancer, prostate cancer, breast cancer, and cervical cancer. Checklist reviewing preventive services available has been given to the patient.    Reviewed patients plan of care and provided an AVS. The Basic Care Plan (routine screening as documented in Health Maintenance) for Preston meets the Care Plan requirement. This Care Plan has been established and reviewed with the Patient.    Counseling Resources:  ATP IV Guidelines  Pooled Cohorts Equation Calculator  Breast Cancer Risk Calculator  FRAX Risk Assessment  ICSI Preventive Guidelines  Dietary Guidelines for Americans, 2010  [employer] MyPlate  ASA Prophylaxis  Lung CA Screening    Tyrel Manning MD  [facility] for HPI/ROS submitted by the patient on 2/17/2017   PHQ-2 Depressed: Not at all, Not at all  PHQ-2 Score: 0  Mr.   "

## 2023-10-16 NOTE — PROGRESS NOTES
Kareen Helton is a 62 year old patient who comes in today for a Blood Pressure check because of ongoing blood pressure monitoring.  Diastolic BP slightly elevated at last clinic visit.   Vital Signs as repeated by RN today: /94 initially, then 136/92 on recheck 5 minutes later. Pulse 76.   Patient is taking medication as prescribed.  Patient is tolerating medications well.  Patient is monitoring Blood Pressure at home on occasion.  Last time was about a week ago with result 130's/80's.  She states this is a little high for her, would generally be more 120's/70's.   Current complaints: none  Patient reports she avoids salt in her diet, doesn't use any additional in meals or cooking.    She reports she doesn't exercise.  Education provided re: shooting for 30 minutes 5 days/week.  Understanding voiced.   Disposition:  Forwarding to provider for review and recommendations.     Za Walsh RN  [facility]

## 2023-10-16 NOTE — TELEPHONE ENCOUNTER
Kareen Helton is a 62 year old patient who comes in today for a Blood Pressure check because of ongoing blood pressure monitoring.  Diastolic BP slightly elevated at last clinic visit.   Vital Signs as repeated by RN today: /94 initially, then 136/92 on recheck 5 minutes later. Pulse 76.   Patient is taking medication as prescribed.  Patient is tolerating medications well.  Patient is monitoring Blood Pressure at home on occasion.  Last time was about a week ago with result 130's/80's.  She states this is a little high for her, would generally be more 120's/70's.   Current complaints: none  Patient reports she avoids salt in her diet, doesn't use any additional in meals or cooking.    She reports she doesn't exercise.  Education provided re: shooting for 30 minutes 5 days/week.  Understanding voiced.   Disposition:  Forwarding to provider for review and recommendations.     Za Walsh RN  [facility]

## 2023-10-19 NOTE — TELEPHONE ENCOUNTER
Per Dr. Barker,  Please ask patient to request records from the ER visits.  My Chart note sent to patient.       Form in Awaiting Patient Response tray on forms desk.

## 2023-11-28 ENCOUNTER — ANESTHESIA EVENT (OUTPATIENT)
Dept: GASTROENTEROLOGY | Facility: CLINIC | Age: 63
End: 2023-11-28
Payer: COMMERCIAL

## 2023-11-28 ASSESSMENT — LIFESTYLE VARIABLES: TOBACCO_USE: 1

## 2023-11-28 NOTE — ANESTHESIA PREPROCEDURE EVALUATION
"Anesthesia Pre-Procedure Evaluation    Patient: Kareen Helton   MRN: [id number] : 1960        Procedure : Procedure(s):  Colonoscopy          Past Medical History:   Diagnosis Date    Essential hypertension 2023      Past Surgical History:   Procedure Laterality Date    COLONOSCOPY      GYN SURGERY      HERNIA REPAIR        Allergies   Allergen Reactions    Amoxicillin Hives, Itching and Swelling      Social History     Tobacco Use    Smoking status: Former     Types: Cigarettes    Smokeless tobacco: Never    Tobacco comments:     I smoked between the ages of 17 and 24   Substance Use Topics    Alcohol use: Yes     Comment: 5 per week      Wt Readings from Last 1 Encounters:   10/03/23 91.5 kg (201 lb 12.8 oz)        Anesthesia Evaluation   Pt has had prior anesthetic. Type: General and MAC.        ROS/MED HX  ENT/Pulmonary:     (+)                tobacco use, Past use,                      Neurologic:  - neg neurologic ROS     Cardiovascular:     (+)  hypertension- -   -  - -                                      METS/Exercise Tolerance:     Hematologic:  - neg hematologic  ROS     Musculoskeletal:  - neg musculoskeletal ROS     GI/Hepatic:  - neg GI/hepatic ROS     Renal/Genitourinary:  - neg Renal ROS     Endo:     (+)               Obesity,       Psychiatric/Substance Use:     (+) psychiatric history depression       Infectious Disease:  - neg infectious disease ROS     Malignancy:       Other:            Physical Exam    Airway  airway exam normal      Mallampati: II   TM distance: > 3 FB   Neck ROM: full   Mouth opening: > 3 cm    Respiratory Devices and Support         Dental  no notable dental history     (+) Minor Abnormalities - some fillings, tiny chips      Cardiovascular   cardiovascular exam normal          Pulmonary   pulmonary exam normal                OUTSIDE LABS:  CBC: No results found for: \"WBC\", \"HGB\", \"HCT\", \"PLT\"  BMP:   Lab Results   Component Value Date     2023    NA " "138 02/14/2022    POTASSIUM 4.5 06/06/2023    POTASSIUM 4.1 02/14/2022    CHLORIDE 101 06/06/2023    CHLORIDE 104 02/14/2022    CO2 28 06/06/2023    CO2 33 (H) 02/14/2022    BUN 10.7 06/06/2023    BUN 13 02/14/2022    CR 0.82 06/06/2023    CR 0.85 02/14/2022    GLC 93 06/06/2023     (H) 02/14/2022     COAGS: No results found for: \"PTT\", \"INR\", \"FIBR\"  POC: No results found for: \"BGM\", \"HCG\", \"HCGS\"  HEPATIC: No results found for: \"ALBUMIN\", \"PROTTOTAL\", \"ALT\", \"AST\", \"GGT\", \"ALKPHOS\", \"BILITOTAL\", \"BILIDIRECT\", \"LIANA\"  OTHER:   Lab Results   Component Value Date    STEPHANIE 9.7 06/06/2023       Anesthesia Plan    ASA Status:  2    NPO Status:  NPO Appropriate    Anesthesia Type: General.   Induction: Propofol, Intravenous.   Maintenance: TIVA.        Consents    Anesthesia Plan(s) and associated risks, benefits, and realistic alternatives discussed. Questions answered and patient/representative(s) expressed understanding.     - Discussed: Risks, Benefits and Alternatives for BOTH SEDATION and the PROCEDURE were discussed     - Discussed with:  Patient            Postoperative Care    Pain management: Multi-modal analgesia, IV analgesics, Oral pain medications.   PONV prophylaxis: Ondansetron (or other 5HT-3), Dexamethasone or Solumedrol, Background Propofol Infusion     Comments:               VALERIA Dominguez CRNA    I have reviewed the pertinent notes and labs in the chart from the past 30 days and (re)examined the patient.  Any updates or changes from those notes are reflected in this note.                  "

## 2023-11-30 ENCOUNTER — HOSPITAL ENCOUNTER (OUTPATIENT)
Facility: CLINIC | Age: 63
Discharge: HOME | End: 2023-11-30
Attending: SURGERY | Admitting: SURGERY
Payer: COMMERCIAL

## 2023-11-30 ENCOUNTER — ANESTHESIA (OUTPATIENT)
Dept: GASTROENTEROLOGY | Facility: CLINIC | Age: 63
End: 2023-11-30
Payer: COMMERCIAL

## 2023-11-30 VITALS
WEIGHT: 201 LBS | BODY MASS INDEX: 35.61 KG/M2 | RESPIRATION RATE: 16 BRPM | TEMPERATURE: 97.7 F | OXYGEN SATURATION: 95 % | DIASTOLIC BLOOD PRESSURE: 89 MMHG | SYSTOLIC BLOOD PRESSURE: 121 MMHG | HEIGHT: 63 IN | HEART RATE: 80 BPM

## 2023-11-30 LAB — COLONOSCOPY: NORMAL

## 2023-11-30 PROCEDURE — 45380 COLONOSCOPY AND BIOPSY: CPT | Mod: PT | Performed by: SURGERY

## 2023-11-30 PROCEDURE — 370N000017 HC ANESTHESIA TECHNICAL FEE, PER MIN: Performed by: SURGERY

## 2023-11-30 PROCEDURE — 258N000003 HC RX IP 258 OP 636: Performed by: SURGERY

## 2023-11-30 PROCEDURE — 250N000009 HC RX 250: Performed by: SURGERY

## 2023-11-30 PROCEDURE — 88305 TISSUE EXAM BY PATHOLOGIST: CPT | Mod: TC | Performed by: SURGERY

## 2023-11-30 PROCEDURE — 250N000009 HC RX 250: Performed by: NURSE ANESTHETIST, CERTIFIED REGISTERED

## 2023-11-30 PROCEDURE — 250N000011 HC RX IP 250 OP 636: Performed by: NURSE ANESTHETIST, CERTIFIED REGISTERED

## 2023-11-30 PROCEDURE — 88305 TISSUE EXAM BY PATHOLOGIST: CPT | Mod: 26

## 2023-11-30 RX ORDER — GLYCOPYRROLATE 0.2 MG/ML
INJECTION, SOLUTION INTRAMUSCULAR; INTRAVENOUS PRN
Status: DISCONTINUED | OUTPATIENT
Start: 2023-11-30 | End: 2023-11-30

## 2023-11-30 RX ORDER — PROPOFOL 10 MG/ML
INJECTION, EMULSION INTRAVENOUS CONTINUOUS PRN
Status: DISCONTINUED | OUTPATIENT
Start: 2023-11-30 | End: 2023-11-30

## 2023-11-30 RX ORDER — PROPOFOL 10 MG/ML
INJECTION, EMULSION INTRAVENOUS PRN
Status: DISCONTINUED | OUTPATIENT
Start: 2023-11-30 | End: 2023-11-30

## 2023-11-30 RX ORDER — SODIUM CHLORIDE, SODIUM LACTATE, POTASSIUM CHLORIDE, CALCIUM CHLORIDE 600; 310; 30; 20 MG/100ML; MG/100ML; MG/100ML; MG/100ML
INJECTION, SOLUTION INTRAVENOUS CONTINUOUS
Status: DISCONTINUED | OUTPATIENT
Start: 2023-11-30 | End: 2023-11-30 | Stop reason: HOSPADM

## 2023-11-30 RX ORDER — LIDOCAINE 40 MG/G
CREAM TOPICAL
Status: DISCONTINUED | OUTPATIENT
Start: 2023-11-30 | End: 2023-11-30 | Stop reason: HOSPADM

## 2023-11-30 RX ORDER — LIDOCAINE HYDROCHLORIDE 20 MG/ML
INJECTION, SOLUTION INFILTRATION; PERINEURAL PRN
Status: DISCONTINUED | OUTPATIENT
Start: 2023-11-30 | End: 2023-11-30

## 2023-11-30 RX ADMIN — SODIUM CHLORIDE, POTASSIUM CHLORIDE, SODIUM LACTATE AND CALCIUM CHLORIDE: 600; 310; 30; 20 INJECTION, SOLUTION INTRAVENOUS at 06:52

## 2023-11-30 RX ADMIN — GLYCOPYRROLATE 0.1 MG: 0.2 INJECTION, SOLUTION INTRAMUSCULAR; INTRAVENOUS at 07:18

## 2023-11-30 RX ADMIN — LIDOCAINE HYDROCHLORIDE 60 MG: 20 INJECTION, SOLUTION INFILTRATION; PERINEURAL at 07:19

## 2023-11-30 RX ADMIN — PROPOFOL 150 MCG/KG/MIN: 10 INJECTION, EMULSION INTRAVENOUS at 07:19

## 2023-11-30 RX ADMIN — PROPOFOL 70 MG: 10 INJECTION, EMULSION INTRAVENOUS at 07:19

## 2023-11-30 RX ADMIN — LIDOCAINE HYDROCHLORIDE 0.3 ML: 10 INJECTION, SOLUTION EPIDURAL; INFILTRATION; INTRACAUDAL; PERINEURAL at 06:52

## 2023-11-30 ASSESSMENT — ACTIVITIES OF DAILY LIVING (ADL): ADLS_ACUITY_SCORE: 35

## 2023-11-30 NOTE — ANESTHESIA POSTPROCEDURE EVALUATION
Patient: Kareen Helton    Procedure: Procedure(s):  COLONOSCOPY, WITH POLYPECTOMY AND BIOPSY       Anesthesia Type:  General    Note:  Disposition: Outpatient   Postop Pain Control: Uneventful            Sign Out: Well controlled pain   PONV: No   Neuro/Psych: Uneventful            Sign Out: Acceptable/Baseline neuro status   Airway/Respiratory: Uneventful            Sign Out: Acceptable/Baseline resp. status   CV/Hemodynamics: Uneventful            Sign Out: Acceptable CV status; No obvious hypovolemia; No obvious fluid overload   Other NRE: NONE   DID A NON-ROUTINE EVENT OCCUR? No           Last vitals:  Vitals Value Taken Time   /62 11/30/23 0739   Temp 36.5  C (97.7  F) 11/30/23 0739   Pulse 82 11/30/23 0739   Resp     SpO2 95 % 11/30/23 0741   Vitals shown include unfiled device data.    Electronically Signed By: VALERIA Dominguez CRNA  November 30, 2023  7:43 AM

## 2023-11-30 NOTE — ANESTHESIA CARE TRANSFER NOTE
Patient: Kareen Helton    Procedure: Procedure(s):  COLONOSCOPY, WITH POLYPECTOMY AND BIOPSY       Diagnosis: Screen for colon cancer [Z12.11]  Diagnosis Additional Information: No value filed.    Anesthesia Type:   General     Note:    Oropharynx: oropharynx clear of all foreign objects and spontaneously breathing  Level of Consciousness: awake  Oxygen Supplementation: room air    Independent Airway: airway patency satisfactory and stable  Dentition: dentition unchanged  Vital Signs Stable: post-procedure vital signs reviewed and stable  Report to RN Given: handoff report given  Patient transferred to: Phase II    Handoff Report: Identifed the Patient, Identified the Reponsible Provider, Reviewed the pertinent medical history, Discussed the surgical course, Reviewed Intra-OP anesthesia mangement and issues during anesthesia, Set expectations for post-procedure period and Allowed opportunity for questions and acknowledgement of understanding      Vitals:  Vitals Value Taken Time   /62 11/30/23 0739   Temp 36.5  C (97.7  F) 11/30/23 0739   Pulse 82 11/30/23 0739   Resp     SpO2 95 % 11/30/23 0741   Vitals shown include unfiled device data.    Electronically Signed By: VALERIA Dominguez CRNA  November 30, 2023  7:42 AM

## 2023-11-30 NOTE — LETTER
[facility]areen Helton  [id number]  [place] [address]    December 3, 2023    Dear Kareen,  This letter is written to inform you of the results of your recent colonoscopy.  Your examination showed polyp(s) in your descending colon and sigmoid colon. All polyps were removed in their entirety and sent for review by a pathologist. As you will see on the pathology report below, the tissue(s) were tubular adenomatous polyps and hyperplastic polyps. Your examination was otherwise without abnormality.    Final Diagnosis   A.  Descending colon, polyp, biopsy/polypectomy:  -Diminutive tubular adenoma; negative for high-grade dysplasia or malignancy.     B.  Sigmoid colon, polyp, biopsy/polypectomy:  -Fragments of hyperplastic polyp.       Adenomatous polyps are entirely benign (non-cancerous); however, patients who have developed these polyps are at an increased risk for developing additional polyps in the future. If these are not eventually removed, there is a risk of developing colon cancer. We will advise more frequent examinations with you because of the risk associated with this type of polyp.  Hyperplastic polyps are entirely benign (non-cancerous) and rarely associated with the development of additional polyps or colorectal cancer.    Given these findings,  I recommend that you undergo a repeat colonoscopy in 7 year(s) for surveillance. We will enter you into a recall system so you receive a reminder closer to the time that you are due for repeat examination.     Please remember that this recommendation is made with the understanding that you are not experiencing persistent changes in bowel function, bleeding per rectum, and/or significant abdominal pain. If you experience these symptoms, please contact your primary care provider for a further evaluation.     If you have any questions or concerns about the results of your colonoscopy or the appropriate follow-up, please contact my assistant at  [phone].    Sincerely,        [facility] Health[facility] galeana FACOS [facility]  ___

## 2023-12-03 LAB
PATH REPORT.COMMENTS IMP SPEC: NORMAL
PATH REPORT.COMMENTS IMP SPEC: NORMAL
PATH REPORT.FINAL DX SPEC: NORMAL
PATH REPORT.GROSS SPEC: NORMAL
PATH REPORT.MICROSCOPIC SPEC OTHER STN: NORMAL
PATH REPORT.RELEVANT HX SPEC: NORMAL
PHOTO IMAGE: NORMAL

## 2023-12-13 ENCOUNTER — OFFICE VISIT (OUTPATIENT)
Dept: NEUROLOGY | Facility: CLINIC | Age: 63
End: 2023-12-13
Attending: FAMILY MEDICINE
Payer: COMMERCIAL

## 2023-12-13 DIAGNOSIS — R20.0 NUMBNESS OF FINGERS OF BOTH HANDS: ICD-10-CM

## 2023-12-13 PROCEDURE — 95910 NRV CNDJ TEST 7-8 STUDIES: CPT | Performed by: PSYCHIATRY & NEUROLOGY

## 2023-12-13 NOTE — PROGRESS NOTES
[facility]  Electrodiagnostic Laboratory                 Department of Neurology                                                                                                         Test Date:  2023    Patient: Ronaldo Helton : 1960 Physician: Lawrence Monique MD   Sex: Female AGE: 63 year Ref Phys:    ID#: [id number]   Technician: Ji Gill     History and Examination:  Ronaldo Helton is a 63 year old woman with intermittent positional numbness in both hands.    Results:  Bilateral median and ulnar sensory and mixed nerve conduction studies demonstrated mild right median conduction slowing, accentuated in the transcarpal segment. A left medial antebrachial cutaneous conduction study was normal. Bilateral median and ulnar motor conduction studies demonstrated mild left ulnar conduction slowing across the elbow and relative latency to the right abductor pollicis brevis at the upper limit of the normal range.    Interpretation:  This is a mildly abnormal study, demonstrating electrophysiologic evidence of the following:  Mild right median neuropathy at the wrist.  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     ___________________________  Lawrence Monique MD        Nerve Conduction Studies  Motor Sites      Latency Amplitude Neg. Amp Diff Segment Distance Velocity Neg. Dur Neg Area Diff Temperature Comment   Site (ms) Norm (mV) Norm (%)  cm m/s Norm (ms) (%) ( C)    Left Median (APB) Motor   Wrist 3.3  < 4.4 10.3  > 5.0  Wrist-APB 8   4.5  32.3    Elbow 6.4 - 10.2  > 5.0 -1 Elbow-Wrist 17.5 56  > 48 4.3 -6 32.4    Right Median (APB) Motor   Wrist 4.0  < 4.4 9.9  > 5.0  Wrist-APB 8   5.4  31.9    Elbow 7.1 - 10.0  > 5.0 1 Elbow-Wrist 17.8 57  > 48 5.1 -9 31.9    Left Median/Ulnar (Lumb-Dors Int II) Motor        Median (Lumb I)   Wrist 2.9 - 2.3 -  Wrist-Lumb I 10   4.8  32.8         Ulnar (Dorsal Int (manus))   Wrist 2.6 - 5.4 -  Wrist-Dorsal Int (manus) 10   4.1  32.9    Left  Ulnar (ADM) Motor   Wrist 2.6  < 3.5 9.3  > 5.0  Wrist-ADM 8   4.6  32.7    Bel Elbow 5.3 - 8.0 - -14 Bel Elbow-Wrist 16.5 61  > 48 4.8 -6 32.7    Abv Elbow 6.9 - 8.0 - 0 Abv Elbow-Bel Elbow 7.5 47  > 48 5.1 3 32.7    Right Ulnar (ADM) Motor   Wrist 2.5  < 3.5 9.4  > 5.0  Wrist-ADM 8   4.6  31.9    Bel Elbow 5.1 - 8.1 - -14 Bel Elbow-Wrist 16 62  > 48 4.7 -10 32.1    Abv Elbow 6.4 - 8.5 - 5 Abv Elbow-Bel Elbow 7.5 58  > 48 4.8 4 32.1    Left Ulnar (FDI) Motor   Wrist 3.2 - 8.3 -      3.5  34.2    Bel Elbow 6.2 - 5.9 - -29 Bel Elbow-Wrist 17 57  > 48 3.6 -34 34.3    Abv Elbow 7.9 - 4.8 - -19 Abv Elbow-Bel Elbow 7 41  > 48 3.4 -23 34.2      Sensory Sites      Onset Lat Peak Lat Amp (O-P) Amp (P-P) Segment Distance Velocity Temperature Comment   Site ms (ms)  V Norm ( V)  cm m/s Norm ( C)    Left Medial Antebrachial Cutaneous Sensory   Elbow 1.23 1.63 11 - 12 Elbow-Med Forearm 8 65 - 33.6    Left Median Sensory   Wrist-Dig II 2.4 3.0 38  > 10 57 Wrist-Dig II 14 58  > 48 32.8    Right Median Sensory   Wrist-Dig II 3.0 3.9 20  > 10 29 Wrist-Dig II 14 47  > 48 32.1    Left Median-Ulnar Palmar Sensory        Median   Palm-Wrist 1.35 1.80 74 - 100 Palm-Wrist 8 59 - 32.6         Ulnar   Palm-Wrist 1.58 2.0 7 - 21 Palm-Wrist 8 51 - 32.7    Right Median-Ulnar Palmar Sensory        Median   Palm-Wrist 1.88 2.6 16 - 78 Palm-Wrist 8 43 - 32         Ulnar   Palm-Wrist 1.13 1.43 15 - 33 Palm-Wrist 8 71 - 31.9    Left Ulnar Sensory   Wrist-Dig V 2.3 2.7 2  > 8 1 Wrist-Dig V 12.5 54  > 48 34.1    Right Ulnar Sensory   Wrist-Dig V 2.0 2.6 31  > 8 28 Wrist-Dig V 12.5 63  > 48 32      Inter-Nerve Comparisons     Nerve 1 Value 1 Nerve 2 Value 2 Parameter Result Normal   Sensory Sites   R Median Palm-Wrist 2.6 ms R Ulnar Palm-Wrist 1.43 ms Peak Lat Diff 1.17 ms <0.40   L Median Palm-Wrist 1.80 ms L Ulnar Palm-Wrist 2.0 ms Peak Lat Diff 0.20 ms <0.40           NCS Waveforms:    Motor                    Sensory

## 2023-12-13 NOTE — LETTER
2023       RE: Kareen Helton  [address] [facility] [address]       Dear Colleague,    Thank you for referring your patient, Kareen Helton, to the [facility] [place] at [facility]. Please see a copy of my visit note below.                            [facility]  Electrodiagnostic Laboratory                 Department of Neurology                                                                                                         Test Date:  2023    Patient: Kareen Helton : 1960 Physician: Lawrence Monique MD   Sex: Female AGE: 63 year Ref Phys:    ID#: [id number]   Technician: Ji Gill     History and Examination:  Kareen Helton is a 63 year old woman with intermittent positional numbness in both hands.    Results:  Bilateral median and ulnar sensory and mixed nerve conduction studies demonstrated mild right median conduction slowing, accentuated in the transcarpal segment. A left medial antebrachial cutaneous conduction study was normal. Bilateral median and ulnar motor conduction studies demonstrated mild left ulnar conduction slowing across the elbow and relative latency to the right abductor pollicis brevis at the upper limit of the normal range.    Interpretation:  This is a mildly abnormal study, demonstrating electrophysiologic evidence of the following:  Mild right median neuropathy at the wrist.  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     ___________________________  Lawrence Monique MD        Nerve Conduction Studies  Motor Sites      Latency Amplitude Neg. Amp Diff Segment Distance Velocity Neg. Dur Neg Area Diff Temperature Comment   Site (ms) Norm (mV) Norm (%)  cm m/s Norm (ms) (%) ( C)    Left Median (APB) Motor   Wrist 3.3  < 4.4 10.3  > 5.0  Wrist-APB 8   4.5  32.3    Elbow 6.4 - 10.2  > 5.0 -1 Elbow-Wrist 17.5 56  > 48 4.3 -6 32.4    Right Median (APB) Motor   Wrist 4.0  < 4.4 9.9  > 5.0  Wrist-APB 8    5.4  31.9    Elbow 7.1 - 10.0  > 5.0 1 Elbow-Wrist 17.8 57  > 48 5.1 -9 31.9    Left Median/Ulnar (Lumb-Dors Int II) Motor        Median (Lumb I)   Wrist 2.9 - 2.3 -  Wrist-Lumb I 10   4.8  32.8         Ulnar (Dorsal Int (manus))   Wrist 2.6 - 5.4 -  Wrist-Dorsal Int (manus) 10   4.1  32.9    Left Ulnar (ADM) Motor   Wrist 2.6  < 3.5 9.3  > 5.0  Wrist-ADM 8   4.6  32.7    Bel Elbow 5.3 - 8.0 - -14 Bel Elbow-Wrist 16.5 61  > 48 4.8 -6 32.7    Abv Elbow 6.9 - 8.0 - 0 Abv Elbow-Bel Elbow 7.5 47  > 48 5.1 3 32.7    Right Ulnar (ADM) Motor   Wrist 2.5  < 3.5 9.4  > 5.0  Wrist-ADM 8   4.6  31.9    Bel Elbow 5.1 - 8.1 - -14 Bel Elbow-Wrist 16 62  > 48 4.7 -10 32.1    Abv Elbow 6.4 - 8.5 - 5 Abv Elbow-Bel Elbow 7.5 58  > 48 4.8 4 32.1    Left Ulnar (FDI) Motor   Wrist 3.2 - 8.3 -      3.5  34.2    Bel Elbow 6.2 - 5.9 - -29 Bel Elbow-Wrist 17 57  > 48 3.6 -34 34.3    Abv Elbow 7.9 - 4.8 - -19 Abv Elbow-Bel Elbow 7 41  > 48 3.4 -23 34.2      Sensory Sites      Onset Lat Peak Lat Amp (O-P) Amp (P-P) Segment Distance Velocity Temperature Comment   Site ms (ms)  V Norm ( V)  cm m/s Norm ( C)    Left Medial Antebrachial Cutaneous Sensory   Elbow 1.23 1.63 11 - 12 Elbow-Med Forearm 8 65 - 33.6    Left Median Sensory   Wrist-Dig II 2.4 3.0 38  > 10 57 Wrist-Dig II 14 58  > 48 32.8    Right Median Sensory   Wrist-Dig II 3.0 3.9 20  > 10 29 Wrist-Dig II 14 47  > 48 32.1    Left Median-Ulnar Palmar Sensory        Median   Palm-Wrist 1.35 1.80 74 - 100 Palm-Wrist 8 59 - 32.6         Ulnar   Palm-Wrist 1.58 2.0 7 - 21 Palm-Wrist 8 51 - 32.7    Right Median-Ulnar Palmar Sensory        Median   Palm-Wrist 1.88 2.6 16 - 78 Palm-Wrist 8 43 - 32         Ulnar   Palm-Wrist 1.13 1.43 15 - 33 Palm-Wrist 8 71 - 31.9    Left Ulnar Sensory   Wrist-Dig V 2.3 2.7 2  > 8 1 Wrist-Dig V 12.5 54  > 48 34.1    Right Ulnar Sensory   Wrist-Dig V 2.0 2.6 31  > 8 28 Wrist-Dig V 12.5 63  > 48 32      Inter-Nerve Comparisons     Nerve 1 Value 1 Nerve 2 Value 2  Parameter Result Normal   Sensory Sites   R Median Palm-Wrist 2.6 ms R Ulnar Palm-Wrist 1.43 ms Peak Lat Diff 1.17 ms <0.40   L Median Palm-Wrist 1.80 ms L Ulnar Palm-Wrist 2.0 ms Peak Lat Diff 0.20 ms <0.40           NCS Waveforms:    Motor                    Sensory                           Again, thank you for allowing me to participate in the care of your patient.      Sincerely,    Lawrence Monique MD

## 2024-01-26 ENCOUNTER — VIRTUAL VISIT (OUTPATIENT)
Dept: FAMILY MEDICINE | Facility: CLINIC | Age: 64
End: 2024-01-26
Payer: COMMERCIAL

## 2024-01-26 DIAGNOSIS — F33.42 RECURRENT MAJOR DEPRESSION IN COMPLETE REMISSION (H): ICD-10-CM

## 2024-01-26 DIAGNOSIS — E66.01 MORBID OBESITY (H): ICD-10-CM

## 2024-01-26 DIAGNOSIS — G56.11 RIGHT MEDIAN NERVE NEUROPATHY: ICD-10-CM

## 2024-01-26 DIAGNOSIS — G56.22 ULNAR NEUROPATHY OF LEFT UPPER EXTREMITY: ICD-10-CM

## 2024-01-26 PROCEDURE — 99213 OFFICE O/P EST LOW 20 MIN: CPT | Mod: 95 | Performed by: STUDENT IN AN ORGANIZED HEALTH CARE EDUCATION/TRAINING PROGRAM

## 2024-01-26 NOTE — PROGRESS NOTES
Ronaldo is a 63 year old who is being evaluated via a billable video visit.      How would you like to obtain your AVS? [employer]  If the video visit is dropped, the invitation should be resent by: Text to cell phone: [phone]  Will anyone else be joining your video visit? No          Assessment & Plan     Right median nerve neuropathy  Uncontrolled,Continue brace  - Orthopedic  Referral; Future    Ulnar neuropathy of left upper extremity  As above  - Orthopedic  Referral; Future    Recurrent major depression in complete remission (H24)  stable    Morbid obesity (H)  Pt is aware of the importance of diet and exercise  And how her overall health is impacted by her wieght                Subjective   Ronaldo is a 63 year old, presenting for the following health issues:  Results      1/26/2024    10:17 AM   Additional Questions   Roomed by Sarah (questions completed via [place])   Accompanied by n/a     History of Present Illness       Reason for visit:  To follow up on some testing    She eats 2-3 servings of fruits and vegetables daily.She consumes 0 sweetened beverage(s) daily.She exercises with enough effort to increase her heart rate 30 to 60 minutes per day.  She exercises with enough effort to increase her heart rate 3 or less days per week.   She is taking medications regularly.       Patient had an EMG to evaluate hand pain. She has seen the results and wants to know the next step.    She has morbid obesity. Comorbidity includes hypertension.   She has depression for which she uses Effexor.    Review of Systems  Constitutional, HEENT, cardiovascular, pulmonary, gi and gu systems are negative, except as otherwise noted.      Objective           Vitals:  No vitals were obtained today due to virtual visit.    Physical Exam   GENERAL: alert and no distress  EYES: Eyes grossly normal to inspection.  No discharge or erythema, or obvious scleral/conjunctival abnormalities.  RESP: No audible wheeze,  cough, or visible cyanosis.    SKIN: Visible skin clear. No significant rash, abnormal pigmentation or lesions.  PSYCH: Appropriate affect, tone, and pace of words          EMG result  Results:  Bilateral median and ulnar sensory and mixed nerve conduction studies demonstrated mild right median conduction slowing, accentuated in the transcarpal segment. A left medial antebrachial cutaneous conduction study was normal. Bilateral median and ulnar motor conduction studies demonstrated mild left ulnar conduction slowing across the elbow and relative latency to the right abductor pollicis brevis at the upper limit of the normal range.     Interpretation:  This is a mildly abnormal study, demonstrating electrophysiologic evidence of the following:  Mild right median neuropathy at the wrist.  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   Video-Visit Details    Type of service:  Video Visit       Distant Location (provider location):  On-site  Platform used for Video Visit: Casey  Signed Electronically by: Stefanie Glez MD

## 2024-01-26 NOTE — LETTER
My Depression Action Plan  Name: Kareen Helton   Date of Birth 1960  Date: 1/26/2024    My doctor: Adán Rome   My clinic: [facility] [place]  [address]  [place] [id number] [phone]            GREEN    ZONE   Good Control    What it looks like:   Things are going generally well. You have normal ups and downs. You may even feel depressed from time to time, but bad moods usually last less than a day.   What you need to do:  Continue to care for yourself (see self care plan)  Check your depression survival kit and update it as needed  Follow your physician s recommendations including any medication.  Do not stop taking medication unless you consult with your physician first.             YELLOW         ZONE Getting Worse    What it looks like:   Depression is starting to interfere with your life.   It may be hard to get out of bed; you may be starting to isolate yourself from others.  Symptoms of depression are starting to last most all day and this has happened for several days.   You may have suicidal thoughts but they are not constant.   What you need to do:     Call your care team. Your response to treatment will improve if you keep your care team informed of your progress. Yellow periods are signs an adjustment may need to be made.     Continue your self-care.  Just get dressed and ready for the day.  Don't give yourself time to talk yourself out of it.    Talk to someone in your support network.    Open up your Depression Self-Care Plan/Wellness Kit.             RED    ZONE Medical Alert - Get Help    What it looks like:   Depression is seriously interfering with your life.   You may experience these or other symptoms: You can t get out of bed most days, can t work or engage in other necessary activities, you have trouble taking care of basic hygiene, or basic responsibilities, thoughts of suicide or death that will not go away, self-injurious  behavior.     What you need to do:  Call your care team and request a same-day appointment. If they are not available (weekends or after hours) call your local crisis line, emergency room or 911.          Depression Self-Care Plan / Wellness Kit    Many people find that medication and therapy are helpful treatments for managing depression. In addition, making small changes to your everyday life can help to boost your mood and improve your wellbeing. Below are some tips for you to consider. Be sure to talk with your medical provider and/or behavioral health consultant if your symptoms are worsening or not improving.     Sleep   Sleep hygiene  means all of the habits that support good, restful sleep. It includes maintaining a consistent bedtime and wake time, using your bedroom only for sleeping or sex, and keeping the bedroom dark and free of distractions like a computer, smartphone, or television.     Develop a Healthy Routine  Maintain good hygiene. Get out of bed in the morning, make your bed, brush your teeth, take a shower, and get dressed. Don t spend too much time viewing media that makes you feel stressed. Find time to relax each day.    Exercise  Get some form of exercise every day. This will help reduce pain and release endorphins, the  feel good  chemicals in your brain. It can be as simple as just going for a walk or doing some gardening, anything that will get you moving.      Diet  Strive to eat healthy foods, including fruits and vegetables. Drink plenty of water. Avoid excessive sugar, caffeine, alcohol, and other mood-altering substances.     Stay Connected with Others  Stay in touch with friends and family members.    Manage Your Mood  Try deep breathing, massage therapy, biofeedback, or meditation. Take part in fun activities when you can. Try to find something to smile about each day.     Psychotherapy  Be open to working with a therapist if your provider recommends it.     Medication  Be sure to  take your medication as prescribed. Most anti-depressants need to be taken every day. It usually takes several weeks for medications to work. Not all medicines work for all people. It is important to follow-up with your provider to make sure you have a treatment plan that is working for you. Do not stop your medication abruptly without first discussing it with your provider.    Crisis Resources   These hotlines are for both adults and children. They and are open 24 hours a day, 7 days a week unless noted otherwise.    National Suicide Prevention Lifeline   988 or 3-782-451-KDLJ (4674)    Crisis Text Line    www.crisistextline.org  Text HOME to 156240 from anywhere in the [place], anytime, about any type of crisis. A live, trained crisis counselor will receive the text and respond quickly.    [employer] Lifeline for LGBTQ Youth  A national crisis intervention and suicide lifeline for LGBTQ youth under 25. Provides a safe place to talk without judgement. Call 1-717.502.4506; text START to 543548 or visit www.thetrevorproject.org to talk to a trained counselor.    For [facility] crisis numbers, visit the [facility] website at:  https://[place].gov/dhs/people-we-serve/adults/health-care/mental-health/resources/crisis-contacts.jsp

## 2024-02-21 ENCOUNTER — PRE VISIT (OUTPATIENT)
Dept: ORTHOPEDICS | Facility: CLINIC | Age: 64
End: 2024-02-21

## 2024-03-08 ENCOUNTER — OFFICE VISIT (OUTPATIENT)
Dept: PLASTIC SURGERY | Facility: AMBULATORY SURGERY CENTER | Age: 64
End: 2024-03-08
Payer: COMMERCIAL

## 2024-03-08 VITALS
SYSTOLIC BLOOD PRESSURE: 130 MMHG | BODY MASS INDEX: 36.77 KG/M2 | HEIGHT: 62 IN | DIASTOLIC BLOOD PRESSURE: 78 MMHG | WEIGHT: 199.8 LBS

## 2024-03-08 DIAGNOSIS — N62 MACROMASTIA: Primary | ICD-10-CM

## 2024-03-08 PROCEDURE — 99203 OFFICE O/P NEW LOW 30 MIN: CPT | Performed by: PLASTIC SURGERY

## 2024-03-08 NOTE — PROGRESS NOTES
Chief complaint:  Bilateral macromastia     History of present illness:  This is a 63 year old year old who complains of symptomatic bilateral macromastia.  Essentially this patient wears a 36 DD size bra and she is complaining of neck pain, upper back pain, significant shoulder grooving, bad posture as well as feeling pressure on her chest secondary to the weight of her breasts upon her chest. She complains of having difficulties exercising secondary to the size of her breasts.  For the pain she has been taking ibuprofen. She is having difficulty sleeping as well secondary to her macromastia.  In the summertime she also presents with intertrigo at the level of bilateral inframammary folds. She treats this condition with cortisone cream.  She has seen a chiropractor for her neck and back pain but her symptoms persist. Patient feels that her quality of life is affected by her macromastia and she is referred to me for evaluation of possible bilateral reduction mammoplasty.     Past medical history:  Mitral valve regurgitation under control with atenolol     Past surgical history:  Hysterectomy, bilateral inguinal hernia repair     Allergies:  Penicillin     Medications:    Current Outpatient Medications:     atenolol (TENORMIN) 25 MG tablet, Take 0.5 tablets (12.5 mg) by mouth daily, Disp: 45 tablet, Rfl: 3    venlafaxine (EFFEXOR) 37.5 MG tablet, Take 1 tablet (37.5 mg) by mouth daily, Disp: 90 tablet, Rfl: 3     Family history:  Father with colon cancer, sister with breast cancer     GYN history:  G 2, P 2     Social History:  Denies tobacco, drinks alcohol socially     Review of systems:  General ROS: No complaints or constitutional symptoms  Skin: No complaints or symptoms   Hematologic/Lymphatic: No symptoms or complaints  Psychiatric: No symptoms or complaints  Endocrine: No excessive fatigue, no hypermetabolic symptoms reported  Respiratory ROS: No cough, shortness of breath, or wheezing  Cardiovascular ROS: No  "chest pain or dyspnea on exertion  Breast ROS: Denies nipple discharge, denies nipple inversion, denies palpable breast masses, denies changes in the color of the skin of both breasts  Gastrointestinal ROS: No abdominal pain, nausea, diarrhea, or constipation  Musculoskeletal ROS: Complains of neck and upper back pain.  Neurological ROS: No focal neurologic defects reported.       Physical exam:    /78   Ht 1.575 m (5' 2\")   Wt 90.6 kg (199 lb 12.8 oz)   BMI 36.54 kg/m    General: Alert, cooperative, appears stated age   Skin: Skin color, texture, turgor normal, no rashes or lesions   Lymphatic: No obvious adenopathy, no swelling   Eyes: No scleral icterus, pupils equal  HENT: No traumatic injury to the head or face, no gross abnormalities  Lungs: Normal respiratory effort, breath sounds equal bilaterally  Heart: Regular rate and rhythm  Breasts: Bilateral grade 2 ptosis. There is no nipple inversion or nipple discharge.  There is no peau d'orange.  There are no palpable breast masses.  There are no palpable axillary lymphadenopathies.  The right breast presents with sternal notch to nipple distance of 31 cm, nipple to inframammary fold 11 cm, and breast diameter 29 cm.  On the left breast sternal notch to nipple distance is 30 cm, nipple to inframammary fold is 12 cm and breast diameter is 30 cm.  Abdomen: Soft, non-distended and non-tender to palpation  Neurologic: Grossly intact                              Assessment:     63 year old years old female with symptomatic bilateral macromastia.     According to the Schnur scale based upon her body surface area which is 1.90 which is given by her height 157 cm and her weight which is 91 kg, this patient should have at least 600 g removed per breast.         PLAN:   This patient is a good surgical candidate for bilateral reduction mammoplasty.  I will utilize Dickinson pattern reduction with inferior pedicle.     Risks were explained to the patient and they include " but are not limited to scarring, keloid, infection, bleeding, temporary versus permanent altered sensation of the nipple areolar complexes as well of the skin of both breasts, necrosis of the nipple areolar complex requiring potential tattooing, inability to breast-feed, need for further surgeries. Patient has acknowledged all these risks and has agreed to proceed.    Time spent with the patient and charting, 30 minutes.    Jett Gentile MD , FACS   Diplomate American Board of Plastic Surgery  Diplomate American Board of Surgery  Adj. Assistant Professor of Surgery  Division of Plastic & Reconstructive Surgery   [facility]  Office: [phone]   3/8/2024 at 10:50 AM

## 2024-03-08 NOTE — LETTER
3/8/2024         RE: Kareen Helton  [address] [facility] [address]        Dear Colleague,    Thank you for referring your patient, Kareen Helton, to the [facility] [place]. Please see a copy of my visit note below.    Chief complaint:  Bilateral macromastia     History of present illness:  This is a 63 year old year old who complains of symptomatic bilateral macromastia.  Essentially this patient wears a 36 DD size bra and she is complaining of neck pain, upper back pain, significant shoulder grooving, bad posture as well as feeling pressure on her chest secondary to the weight of her breasts upon her chest. She complains of having difficulties exercising secondary to the size of her breasts.  For the pain she has been taking ibuprofen. She is having difficulty sleeping as well secondary to her macromastia.  In the summertime she also presents with intertrigo at the level of bilateral inframammary folds. She treats this condition with cortisone cream.  She has seen a chiropractor for her neck and back pain but her symptoms persist. Patient feels that her quality of life is affected by her macromastia and she is referred to me for evaluation of possible bilateral reduction mammoplasty.     Past medical history:  Mitral valve regurgitation under control with atenolol     Past surgical history:  Hysterectomy, bilateral inguinal hernia repair     Allergies:  Penicillin     Medications:    Current Outpatient Medications:      atenolol (TENORMIN) 25 MG tablet, Take 0.5 tablets (12.5 mg) by mouth daily, Disp: 45 tablet, Rfl: 3     venlafaxine (EFFEXOR) 37.5 MG tablet, Take 1 tablet (37.5 mg) by mouth daily, Disp: 90 tablet, Rfl: 3     Family history:  Father with colon cancer, sister with breast cancer     GYN history:  G 2, P 2     Social History:  Denies tobacco, drinks alcohol socially     Review of systems:  General ROS: No complaints or constitutional symptoms  Skin: No complaints  "or symptoms   Hematologic/Lymphatic: No symptoms or complaints  Psychiatric: No symptoms or complaints  Endocrine: No excessive fatigue, no hypermetabolic symptoms reported  Respiratory ROS: No cough, shortness of breath, or wheezing  Cardiovascular ROS: No chest pain or dyspnea on exertion  Breast ROS: Denies nipple discharge, denies nipple inversion, denies palpable breast masses, denies changes in the color of the skin of both breasts  Gastrointestinal ROS: No abdominal pain, nausea, diarrhea, or constipation  Musculoskeletal ROS: Complains of neck and upper back pain.  Neurological ROS: No focal neurologic defects reported.       Physical exam:    /78   Ht 1.575 m (5' 2\")   Wt 90.6 kg (199 lb 12.8 oz)   BMI 36.54 kg/m    General: Alert, cooperative, appears stated age   Skin: Skin color, texture, turgor normal, no rashes or lesions   Lymphatic: No obvious adenopathy, no swelling   Eyes: No scleral icterus, pupils equal  HENT: No traumatic injury to the head or face, no gross abnormalities  Lungs: Normal respiratory effort, breath sounds equal bilaterally  Heart: Regular rate and rhythm  Breasts: Bilateral grade 2 ptosis. There is no nipple inversion or nipple discharge.  There is no peau d'orange.  There are no palpable breast masses.  There are no palpable axillary lymphadenopathies.  The right breast presents with sternal notch to nipple distance of 31 cm, nipple to inframammary fold 11 cm, and breast diameter 29 cm.  On the left breast sternal notch to nipple distance is 30 cm, nipple to inframammary fold is 12 cm and breast diameter is 30 cm.  Abdomen: Soft, non-distended and non-tender to palpation  Neurologic: Grossly intact                              Assessment:     63 year old years old female with symptomatic bilateral macromastia.     According to the Schnur scale based upon her body surface area which is 1.90 which is given by her height 157 cm and her weight which is 91 kg, this patient " should have at least 450 g removed per breast.         PLAN:   This patient is a good surgical candidate for bilateral reduction mammoplasty.  I will utilize Dickinson pattern reduction with inferior pedicle.     Risks were explained to the patient and they include but are not limited to scarring, keloid, infection, bleeding, temporary versus permanent altered sensation of the nipple areolar complexes as well of the skin of both breasts, necrosis of the nipple areolar complex requiring potential tattooing, inability to breast-feed, need for further surgeries. Patient has acknowledged all these risks and has agreed to proceed.    Time spent with the patient and charting, 30 minutes.    Jett Gentile MD , FACS   Diplomate American Board of Plastic Surgery  Diplomate American Board of Surgery  Adj. Assistant Professor of Surgery  Division of Plastic & Reconstructive Surgery   [facility]  Office: [phone]   3/8/2024 at 10:50 AM          Again, thank you for allowing me to participate in the care of your patient.        Sincerely,        Jett Gentile MD

## 2024-03-12 NOTE — PROGRESS NOTES
Chief Complaint: No chief complaint on file.      Referring Physician: Stefanie Glez    Diagnosis: bilateral thumb CMC arthritis, right carpal tunnel syndrome, left cubital tunnel syndrome  Treatment:   3/19/2024: right thumb CMC steroid injection    History of Present Illness: Kareen Helton is a 63 year old LHD female presenting for evaluation of right thumb base pain. The pain has been present for some time.  She also notes some numbness in her bilateral hands, particularly her right thumb and left ring and small fingers, but this is less bothersome to her than the right thumb base pain and she would like to focus on that today.  She has tried some thumb splinting which has helped her symptoms.    Clinical documentation by Dr. Glez on 1/26/2024 was reviewed.    Occupation: typing    Past Medical History:   Past Medical History:   Diagnosis Date    Essential hypertension 6/6/2023       Past Surgical History:   Past Surgical History:   Procedure Laterality Date    COLONOSCOPY      COLONOSCOPY N/A 11/30/2023    Procedure: COLONOSCOPY, WITH POLYPECTOMY AND BIOPSY;  Surgeon: Ventura Pascal MD;  Location: [facility]    GYN SURGERY      HERNIA REPAIR         Medications:   Current Outpatient Medications:     atenolol (TENORMIN) 25 MG tablet, Take 0.5 tablets (12.5 mg) by mouth daily, Disp: 45 tablet, Rfl: 3    venlafaxine (EFFEXOR) 37.5 MG tablet, Take 1 tablet (37.5 mg) by mouth daily, Disp: 90 tablet, Rfl: 3    Allergy:   Allergies   Allergen Reactions    Amoxicillin Hives, Itching and Swelling       Social History:   History   Smoking Status    Former    Types: Cigarettes   Smokeless Tobacco    Never      Social History     Tobacco Use    Smoking status: Former     Types: Cigarettes    Smokeless tobacco: Never    Tobacco comments:     I smoked between the ages of 17 and 24   Vaping Use    Vaping Use: Never used   Substance Use Topics    Alcohol use: Yes     Comment: 5 per week    Drug use: Never         Family History:   Family History   Problem Relation Age of Onset    Hypertension Mother     Hyperlipidemia Mother     Emphysema Mother     Colorectal Cancer Father     Cerebrovascular Disease Father     Colon Cancer Father     Hypertension Maternal Grandmother     Hyperlipidemia Maternal Grandmother     Cerebrovascular Disease Maternal Grandmother     Emphysema Paternal Grandfather     Hypertension Brother     Hypertension Sister     Hyperlipidemia Sister     Asthma Son        Physical Examination:  There were no vitals filed for this visit.  There is no height or weight on file to calculate BMI.    Well appearing, well nourished  Alert and oriented x 3, cooperative with exam     Right Upper Extremity:  TTP CMC joint: yes   TTP STT joint: yes   Grind test:yes   Laxity/Shear test: no   MP hyper-extension: no   Thenar atrophy: no   Tinel's sign (volar wrist): negative   Phalen test: positive   Durkan's test: negative   Hypothenar atrophy: no   Sensory Exam:   Sensation intact to light touch in volar IF (median), volar SF (ulnar), and FDWS (radial).    Two Point Discrimination:   Thumb 5, IF 5, MF 5, RF  6 , SF 6  Vascular Exam:   2+ radial pulse, < 2 sec capillary refill    Left Upper Extremity:  Thenar atrophy: no   Tinel's sign (volar wrist): negative   Phalen test: negative   Durkan's test: negative   Hypothenar atrophy: no   Tinel's sign (medial elbow): negative   Elbow flexion test: positive   Sensory Exam:   Sensation intact to light touch in volar IF (median), volar SF (ulnar), and FDWS (radial).    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  Mild left  ulnar neuropathy at the elbow.     Assessment: Kareen Helton is a 63 year old female with right thumb CMC arthritis, left cubital tunnel, right carpal tunnel    Plan:   I had a discussion with the patient regarding my clinical findings, diagnosis, and treatment plan. I reviewed the treatment options for basal joint arthritis (observation, bracing, steroid injection, occupational therapy, surgery, etc.) as well as the risks and benefits of each.  Her right carpal tunnel and left cubital tunnel symptoms are not very bothersome at this point so she does not want treatment for those at this time.  I do recommend elbow extension at night for the left elbow and a wrist brace at night for the right wrist, to which she agrees.  For the thumb CMC pain, she elects bracing and a steroid injection today.  If symptoms are persistent at the time of follow-up, repeat steroid injection or surgical intervention may be indicated.  The patient understands and agrees to the treatment plan.  All questions answered.      Recommend comfort cool brace during the day, Reynolds thumb stabilizer at night  - Right wrist in neutral position at night with brace, left elbow in extension at night   Steroid injection given today.  See procedure note below.      Procedure Note: After a discussion with Kareen Helton regarding treatment options, we decided to proceed with a steroid injection to the right thumb CMC joint.  Risks of the procedure including transient hyperglycemia, fat atrophy, skin depigmentation tendon/ligament weakening, and infection were discussed prior to injection and verbal consent from Kareen Helton was obtained. The area was prepped with alcohol.  10  mg Kenalog and 0.5 mL of 1% lidocaine was injected.  Kareen Helton tolerated the injection well. A clean dressing was placed over the site of the injection. Kareen Helton was given post injection instructions.       Next Visit:   Follow-up: 3 months   Imaging: None   Plan: Symptom  check.        EMMA MICHAEL MD

## 2024-03-13 ENCOUNTER — DOCUMENTATION ONLY (OUTPATIENT)
Dept: PLASTIC SURGERY | Facility: AMBULATORY SURGERY CENTER | Age: 64
End: 2024-03-13
Payer: COMMERCIAL

## 2024-03-13 ENCOUNTER — MYC MEDICAL ADVICE (OUTPATIENT)
Dept: SURGERY | Facility: CLINIC | Age: 64
End: 2024-03-13
Payer: COMMERCIAL

## 2024-03-13 NOTE — PROGRESS NOTES
Surgical Prior-Auth (Pre-Scheduling)  DOS: TBD   Facility: [facility]   Insurance: [employer]   Plan Exclusion? -   Case # N/[id number]   CPT: 05507 Description: Breast Reducation       Clinicals Provided:    Order: -   Visit Notes: yes   Results: -   Photos: yes   Other: -   Submitted Via: [employer] email   Date Submitted: 3/13/2024       Follow-up Phone# [phone]   : Carolynn Dave       Surgeon Name: Jett Polk NPI: [id number]

## 2024-03-13 NOTE — LETTER
Pre-op Physical: 5/21/2024 at 10:00 am with Dr. Stephen at the [facility]    Surgery Date: 6/10/2024     Location: [place] [address]    Approximate Arrival Time: 5:30 am  (Unless instructed differently by the pre-op call nurse)     Post op Appointment: 6/14/2024 at  9:45 am with  Dr. Gentile  [facility] [address] [place] [address] [address] [address].    Pre-Surgical Tasks:     Schedule a pre-op physical with your primary care doctor if not internal to [facility].  If internal, we have scheduled this.   The pre-op physical must be 10-30 days before surgery and since it is required by anesthesia, your surgery will be cancelled if it's not done.      Review all medications with your primary care or prescribing physician; they will advise you which meds to stop and when, and when you can resume taking.  Certain medications like blood thinners and weight loss medications need to be stopped in advance of surgery to proceed safely.      Blood thinners including but not exclusive to drugs like Xarelto, Eliquis, Warfarin and Aspirin, should be stopped five days before surgery, if your prescribing provider agrees. Follow your provider's advice on stopping blood thinners because they know you best.  If you are unsure if your medication is a blood thinner, ask your prescribing provider.    Weight loss medications: There are multiple medications being used for weight management and diabetes today, and the list is growing.  Phentermine, Ozempic, Wegovy, Trulicity, and other similar medications need to be stopped one week before surgery to avoid being cancelled.  Victoza and Saxenda can be continued longer but must be stopped one full day before surgery.  Please ask your prescribing provider for advice.    Diabetic medications: in addition to the medications talked about above that are used for either weight loss or  diabetes, some people are on insulin that may require adjustment.  Please discuss managing diabetic medications with your prescribing doctor as these medications may require modification prior to surgery.     Please shower the evening before and morning of surgery with Hibiclens soap.  Any [facility] can provide this to you at no cost, or it can be found at your local pharmacy.     Fasting instructions will be provided by the pre-op nurse who will call you 1-3 days before surgery.  Typically, we advise normal food up to 8 hours before you arrive for surgery. Clear liquids only from then until 2 hours before you arrive surgery, then nothing at all by mouth.  The nurse will review your specific instructions with you at the call.      Smoking impacts your body's ability to heal properly so we advise patients to quit if possible before surgery.  Plastic Surgery patients are required to be nicotine free for at least 8 weeks before surgery.      You will need an adult to drive you home and stay with you 24 hours after surgery. Public transportation or Medical Van Services are not permitted.    Visitor restrictions are subject to change, please verify with the pre-op nurse when they call how many people are permitted to accompany you.    We always encourage you to notify your insurance any time you have medical tests or procedures scheduled including surgery. The number is usually right on the back of your insurance card. To obtain pricing for surgery, please call [facility] Cost of Care at [phone] or email [email or web address].        Call our office if you have any questions! Thank you!     Carolynn Dave MA  [facility]   (General Surgery/ ENT/ Plastics)  Direct Office: [phone]

## 2024-03-19 ENCOUNTER — OFFICE VISIT (OUTPATIENT)
Dept: ORTHOPEDICS | Facility: CLINIC | Age: 64
End: 2024-03-19
Attending: STUDENT IN AN ORGANIZED HEALTH CARE EDUCATION/TRAINING PROGRAM
Payer: COMMERCIAL

## 2024-03-19 ENCOUNTER — ANCILLARY PROCEDURE (OUTPATIENT)
Dept: GENERAL RADIOLOGY | Facility: CLINIC | Age: 64
End: 2024-03-19
Attending: STUDENT IN AN ORGANIZED HEALTH CARE EDUCATION/TRAINING PROGRAM
Payer: COMMERCIAL

## 2024-03-19 DIAGNOSIS — G56.22 ULNAR NEUROPATHY OF LEFT UPPER EXTREMITY: ICD-10-CM

## 2024-03-19 DIAGNOSIS — G56.11 RIGHT MEDIAN NERVE NEUROPATHY: ICD-10-CM

## 2024-03-19 DIAGNOSIS — M19.049 HAND ARTHRITIS: Primary | ICD-10-CM

## 2024-03-19 PROCEDURE — 99203 OFFICE O/P NEW LOW 30 MIN: CPT | Mod: 25 | Performed by: STUDENT IN AN ORGANIZED HEALTH CARE EDUCATION/TRAINING PROGRAM

## 2024-03-19 PROCEDURE — 73120 X-RAY EXAM OF HAND: CPT | Mod: 52 | Performed by: RADIOLOGY

## 2024-03-19 PROCEDURE — 20600 DRAIN/INJ JOINT/BURSA W/O US: CPT | Mod: F5 | Performed by: STUDENT IN AN ORGANIZED HEALTH CARE EDUCATION/TRAINING PROGRAM

## 2024-03-19 RX ORDER — LIDOCAINE HYDROCHLORIDE 10 MG/ML
0.5 INJECTION, SOLUTION EPIDURAL; INFILTRATION; INTRACAUDAL; PERINEURAL
Status: SHIPPED | OUTPATIENT
Start: 2024-03-19

## 2024-03-19 RX ADMIN — LIDOCAINE HYDROCHLORIDE 0.5 ML: 10 INJECTION, SOLUTION EPIDURAL; INFILTRATION; INTRACAUDAL; PERINEURAL at 15:09

## 2024-03-19 NOTE — LETTER
3/19/2024         RE: Kareen Helton  [address] [facility] [address]        Dear Colleague,    Thank you for referring your patient, Kareen Helton, to the [facility] [place]. Please see a copy of my visit note below.    Chief Complaint: No chief complaint on file.      Referring Physician: Stefanie Glez    Diagnosis: bilateral thumb CMC arthritis, right carpal tunnel syndrome, left cubital tunnel syndrome  Treatment:   3/19/2024: right thumb CMC steroid injection    History of Present Illness: Kareen Helton is a 63 year old LHD female presenting for evaluation of right thumb base pain. The pain has been present for some time.  She also notes some numbness in her bilateral hands, particularly her right thumb and left ring and small fingers, but this is less bothersome to her than the right thumb base pain and she would like to focus on that today.  She has tried some thumb splinting which has helped her symptoms.    Clinical documentation by Dr. Glez on 1/26/2024 was reviewed.    Occupation: typing    Past Medical History:   Past Medical History:   Diagnosis Date    Essential hypertension 6/6/2023       Past Surgical History:   Past Surgical History:   Procedure Laterality Date    COLONOSCOPY      COLONOSCOPY N/A 11/30/2023    Procedure: COLONOSCOPY, WITH POLYPECTOMY AND BIOPSY;  Surgeon: Ventura Pascal MD;  Location: [place] [facility]    GYN SURGERY      HERNIA REPAIR         Medications:   Current Outpatient Medications:     atenolol (TENORMIN) 25 MG tablet, Take 0.5 tablets (12.5 mg) by mouth daily, Disp: 45 tablet, Rfl: 3    venlafaxine (EFFEXOR) 37.5 MG tablet, Take 1 tablet (37.5 mg) by mouth daily, Disp: 90 tablet, Rfl: 3    Allergy:   Allergies   Allergen Reactions    Amoxicillin Hives, Itching and Swelling       Social History:   History   Smoking Status    Former    Types: Cigarettes   Smokeless Tobacco    Never      Social History     Tobacco Use    Smoking status:  Former     Types: Cigarettes    Smokeless tobacco: Never    Tobacco comments:     I smoked between the ages of 17 and 24   Vaping Use    Vaping Use: Never used   Substance Use Topics    Alcohol use: Yes     Comment: 5 per week    Drug use: Never        Family History:   Family History   Problem Relation Age of Onset    Hypertension Mother     Hyperlipidemia Mother     Emphysema Mother     Colorectal Cancer Father     Cerebrovascular Disease Father     Colon Cancer Father     Hypertension Maternal Grandmother     Hyperlipidemia Maternal Grandmother     Cerebrovascular Disease Maternal Grandmother     Emphysema Paternal Grandfather     Hypertension Brother     Hypertension Sister     Hyperlipidemia Sister     Asthma Son        Physical Examination:  There were no vitals filed for this visit.  There is no height or weight on file to calculate BMI.    Well appearing, well nourished  Alert and oriented x 3, cooperative with exam     Right Upper Extremity:  TTP CMC joint: yes   TTP STT joint: yes   Grind test:yes   Laxity/Shear test: no   MP hyper-extension: no   Thenar atrophy: no   Tinel's sign (volar wrist): negative   Phalen test: positive   Durkan's test: negative   Hypothenar atrophy: no   Sensory Exam:   Sensation intact to light touch in volar IF (median), volar SF (ulnar), and FDWS (radial).    Two Point Discrimination:   Thumb 5, IF 5, MF 5, RF  6 , SF 6  Vascular Exam:   2+ radial pulse, < 2 sec capillary refill    Left Upper Extremity:  Thenar atrophy: no   Tinel's sign (volar wrist): negative   Phalen test: negative   Durkan's test: negative   Hypothenar atrophy: no   Tinel's sign (medial elbow): negative   Elbow flexion test: positive   Sensory Exam:   Sensation intact to light touch in volar IF (median), volar SF (ulnar), and FDWS (radial).    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     Assessment: Kareen Helton is a 63 year old female with right thumb CMC arthritis, left cubital tunnel, right carpal tunnel    Plan:   I had a discussion with the patient regarding my clinical findings, diagnosis, and treatment plan. I reviewed the treatment options for basal joint arthritis (observation, bracing, steroid injection, occupational therapy, surgery, etc.) as well as the risks and benefits of each.  Her right carpal tunnel and left cubital tunnel symptoms are not very bothersome at this point so she does not want treatment for those at this time.  I do recommend elbow extension at night for the left elbow and a wrist brace at night for the right wrist, to which she agrees.  For the thumb CMC pain, she elects bracing and a steroid injection today.  If symptoms are persistent at the time of follow-up, repeat steroid injection or surgical intervention may be indicated.  The patient understands and agrees to the treatment plan.  All questions answered.      Recommend comfort cool brace during the day, Reynolds thumb stabilizer at night  - Right wrist in neutral position at night with brace, left elbow in extension at night   Steroid injection given today.  See procedure note below.      Procedure Note: After a discussion with Kareen Helton regarding treatment options, we decided to proceed with a steroid injection to the right thumb CMC joint.  Risks of the procedure including transient hyperglycemia, fat atrophy, skin depigmentation tendon/ligament weakening, and infection were discussed prior to injection and verbal consent from Kareen Helton was obtained. The area was prepped with alcohol.  10  mg Kenalog and 0.5 mL of 1% lidocaine  was injected.  Kareen Helton tolerated the injection well. A clean dressing was placed over the site of the injection. Kareen Helton was given post injection instructions.       Next Visit:   Follow-up: 3 months   Imaging: None   Plan: Symptom check.        VIOLETTA MICHAEL MD      Hand / Upper Extremity Injection/Arthrocentesis: R thumb CMC    Date/Time: 3/19/2024 3:09 PM    Performed by: Violetta Michael MD  Authorized by: Violetta Michael MD    Indications:  Pain  Needle Size:  25 G  Guidance: landmark    Condition: osteoarthritis    Location:  Thumb  Site:  R thumb CMC    Medications:  10 mg triamcinolone acetonide 10 MG/ML; 0.5 mL lidocaine (PF) 1 %  Outcome:  Tolerated well, no immediate complications  Procedure discussed: discussed risks, benefits, and alternatives    Consent Given by:  Patient  Timeout: timeout called immediately prior to procedure    Prep: patient was prepped and draped in usual sterile fashion        [facility] [address] [address] [phone]  Dept: [phone]  ______________________________________________________________________________    Patient: Kareen Helton   : 1960   MRN: [id number]   2024    INVASIVE PROCEDURE SAFETY CHECKLIST    Date: 3/19/2024   Procedure:Right thumb CMC joint steroid injection  Patient Name: Kareen Helton  MRN: [id number]  YOB: 1960    Action: Complete sections as appropriate. Any discrepancy results in a HARD COPY until resolved.     PRE PROCEDURE:  Patient ID verified with 2 identifiers (name and  or MRN): Yes  Procedure and site verified with patient/designee (when able): Yes  Accurate consent documentation in medical record: Yes  H&P (or appropriate assessment) documented in medical record: Yes  H&P must be up to 20 days prior to procedure and updates within 24 hours of procedure as applicable: Yes  Relevant diagnostic and radiology test  results appropriately labeled and displayed as applicable: Yes  Procedure site(s) marked with provider initials: NA    TIMEOUT:  Time-Out performed immediately prior to starting procedure, including verbal and active participation of all team members addressing the following:Yes  * Correct patient identify  * Confirmed that the correct side and site are marked  * An accurate procedure consent form  * Agreement on the procedure to be done  * Correct patient position  * Relevant images and results are properly labeled and appropriately displayed  * The need to administer antibiotics or fluids for irrigation purposes during the procedure as applicable   * Safety precautions based on patient history or medication use    DURING PROCEDURE: Verification of correct person, site, and procedures any time the responsibility for care of the patient is transferred to another member of the care team.       The following medications were given:         Prior to injection, verified patient identity using patient's name and date of birth.  Due to injection administration, patient instructed to remain in clinic for 15 minutes  afterwards, and to report any adverse reaction to me immediately.    Joint injection was performed.    Medication Name: Kenalog 10 NDC 3524-9009-30  Drug Amount Wasted:  Yes: 40 mg/ml   Vial/Syringe: Single dose vial  Expiration Date:  12/1/25    Medication Name Lidocaine 1% NDC 25997-402-69    Scribed by Rosina Moreno ATC for Dr. Ball on March 19, 2024 at 3:11 pm based on the provider's statements to me.     REGGIE Richardson MD

## 2024-03-19 NOTE — PROGRESS NOTES
Hand / Upper Extremity Injection/Arthrocentesis: R thumb CMC    Date/Time: 3/19/2024 3:09 PM    Performed by: Violetta Crook MD  Authorized by: Violetta Crook MD    Indications:  Pain  Needle Size:  25 G  Guidance: landmark    Condition: osteoarthritis    Location:  Thumb  Site:  R thumb CMC    Medications:  10 mg triamcinolone acetonide 10 MG/ML; 0.5 mL lidocaine (PF) 1 %  Outcome:  Tolerated well, no immediate complications  Procedure discussed: discussed risks, benefits, and alternatives    Consent Given by:  Patient  Timeout: timeout called immediately prior to procedure    Prep: patient was prepped and draped in usual sterile fashion        [facility] [address] [id number] [phone]  Dept: [phone]  ______________________________________________________________________________    Patient: Kareen Helton   : 1960   MRN: [id number]   2024    INVASIVE PROCEDURE SAFETY CHECKLIST    Date: 3/19/2024   Procedure:Right thumb CMC joint steroid injection  Patient Name: Kareen Helton  MRN: [id number]  YOB: 1960    Action: Complete sections as appropriate. Any discrepancy results in a HARD COPY until resolved.     PRE PROCEDURE:  Patient ID verified with 2 identifiers (name and  or MRN): Yes  Procedure and site verified with patient/designee (when able): Yes  Accurate consent documentation in medical record: Yes  H&P (or appropriate assessment) documented in medical record: Yes  H&P must be up to 20 days prior to procedure and updates within 24 hours of procedure as applicable: Yes  Relevant diagnostic and radiology test results appropriately labeled and displayed as applicable: Yes  Procedure site(s) marked with provider initials: NA    TIMEOUT:  Time-Out performed immediately prior to starting procedure, including verbal and active participation of all team members addressing the following:Yes  *  Correct patient identify  * Confirmed that the correct side and site are marked  * An accurate procedure consent form  * Agreement on the procedure to be done  * Correct patient position  * Relevant images and results are properly labeled and appropriately displayed  * The need to administer antibiotics or fluids for irrigation purposes during the procedure as applicable   * Safety precautions based on patient history or medication use    DURING PROCEDURE: Verification of correct person, site, and procedures any time the responsibility for care of the patient is transferred to another member of the care team.       The following medications were given:         Prior to injection, verified patient identity using patient's name and date of birth.  Due to injection administration, patient instructed to remain in clinic for 15 minutes  afterwards, and to report any adverse reaction to me immediately.    Joint injection was performed.    Medication Name: Kenalog 10 NDC 5753-3338-30  Drug Amount Wasted:  Yes: 40 mg/ml   Vial/Syringe: Single dose vial  Expiration Date:  12/1/25    Medication Name Lidocaine 1% NDC 19192-668-85    Scribed by Rosina Moreno ATC for Dr. Ball on March 19, 2024 at 3:11 pm based on the provider's statements to me.     Rosina Moreno ATC

## 2024-03-19 NOTE — NURSING NOTE
Reason For Visit:   Chief Complaint   Patient presents with    Consult     Consult for right median nerve neuropathy and left ulnar nerve neuropathy       Primary MD: [facility]  Ref. MD: Amaris    Age: 63 year old    ?  No      There were no vitals taken for this visit.      Pain Assessment  Patient Currently in Pain: Yes (more concerned about pain in right hand, numbness is only in left, onset about a year ago)  0-10 Pain Scale: 2 (gets to 5/10)  Primary Pain Location: Hand (bilateral, right>left)  Pain Descriptors: Numbness, Other (comment), Intermittent, Aching, Sore (swelling around right thumb)  Alleviating Factors: Other (comment), Rest, NSAIDS (brace)    Hand Dominance Evaluation  Hand Dominance: Left          QuickDASH Assessment      3/14/2024    12:18 PM   QuickDASH Main   1. Open a tight or new jar Moderate difficulty   2. Do heavy household chores (e.g., wash walls, floors) Moderate difficulty   3. Carry a shopping bag or briefcase Moderate difficulty   4. Wash your back Moderate difficulty   5. Use a knife to cut food Moderate difficulty   6. Recreational activities in which you take some force or impact through your arm, shoulder or hand (e.g., golf, hammering, tennis, etc.) Moderate difficulty   7. During the past week, to what extent has your arm, shoulder or hand problem interfered with your normal social activities with family, friends, neighbours or groups Not at all   8. During the past week, were you limited in your work or other regular daily activities as a result of your arm, shoulder or hand problem Moderately limited   9. Arm, shoulder or hand pain Moderate   10.Tingling (pins and needles) in your arm,shoulder or hand Moderate   11. During the past week, how much difficulty have you had sleeping because of the pain in your arm, shoulder or hand Mild difficulty   Quickdash Ability Score 43.18          Current Outpatient Medications   Medication Sig Dispense  Refill    atenolol (TENORMIN) 25 MG tablet Take 0.5 tablets (12.5 mg) by mouth daily 45 tablet 3    venlafaxine (EFFEXOR) 37.5 MG tablet Take 1 tablet (37.5 mg) by mouth daily 90 tablet 3       Allergies   Allergen Reactions    Amoxicillin Hives, Itching and Swelling       Rosina Moreno, ATC

## 2024-03-21 NOTE — PROGRESS NOTES
DENIED    REASON: DOES NOT MEET MEDICAL POLICY REQUIREMENTS PER MEDICA.  LETTER SCANNED INTO CHART

## 2024-03-22 NOTE — PROGRESS NOTES
Upon reviewing this patient and especially after checking her pictures and breast measurements, this patient will benefit of removal of at least 600 g per breast.    I will utilize Dickinson pattern with inferior pedicle.    Jett Gentile MD , FACS   Diplomate American Board of Plastic Surgery  Diplomate American Board of Surgery  Adj. Assistant Professor of Surgery  Division of Plastic & Reconstructive Surgery   [facility]  Office: [phone]   3/22/2024 at 11:07 AM

## 2024-03-28 NOTE — TELEPHONE ENCOUNTER
Appeal has been started for this patient.    Spoke with patient today regarding surgery scheduling , we have decided to schedule out to June to assure enough time for the Appeal to be completed via insurance.     Went over details/instructions.    Surgery Letter sent via [employer]  (Please see LETTERS TAB in chart to retrieve a copy of this letter)

## 2024-05-07 ENCOUNTER — PATIENT OUTREACH (OUTPATIENT)
Dept: CARE COORDINATION | Facility: CLINIC | Age: 64
End: 2024-05-07
Payer: COMMERCIAL

## 2024-05-21 ENCOUNTER — OFFICE VISIT (OUTPATIENT)
Dept: FAMILY MEDICINE | Facility: CLINIC | Age: 64
End: 2024-05-21
Payer: COMMERCIAL

## 2024-05-21 ENCOUNTER — PATIENT OUTREACH (OUTPATIENT)
Dept: CARE COORDINATION | Facility: CLINIC | Age: 64
End: 2024-05-21

## 2024-05-21 VITALS
HEART RATE: 67 BPM | RESPIRATION RATE: 12 BRPM | OXYGEN SATURATION: 97 % | DIASTOLIC BLOOD PRESSURE: 80 MMHG | HEIGHT: 63 IN | TEMPERATURE: 97.9 F | SYSTOLIC BLOOD PRESSURE: 110 MMHG | BODY MASS INDEX: 33.52 KG/M2 | WEIGHT: 189.2 LBS

## 2024-05-21 DIAGNOSIS — Z01.818 PREOP GENERAL PHYSICAL EXAM: Primary | ICD-10-CM

## 2024-05-21 DIAGNOSIS — F33.42 RECURRENT MAJOR DEPRESSION IN COMPLETE REMISSION (H): ICD-10-CM

## 2024-05-21 DIAGNOSIS — I10 ESSENTIAL HYPERTENSION: ICD-10-CM

## 2024-05-21 DIAGNOSIS — N62 GYNECOMASTIA: ICD-10-CM

## 2024-05-21 PROBLEM — E66.01 MORBID OBESITY (H): Status: RESOLVED | Noted: 2023-06-06 | Resolved: 2024-05-21

## 2024-05-21 PROCEDURE — G2211 COMPLEX E/M VISIT ADD ON: HCPCS | Performed by: INTERNAL MEDICINE

## 2024-05-21 PROCEDURE — 99214 OFFICE O/P EST MOD 30 MIN: CPT | Performed by: INTERNAL MEDICINE

## 2024-05-21 RX ORDER — FAMOTIDINE 20 MG/1
20 TABLET, FILM COATED ORAL DAILY
COMMUNITY

## 2024-05-21 ASSESSMENT — PATIENT HEALTH QUESTIONNAIRE - PHQ9
SUM OF ALL RESPONSES TO PHQ QUESTIONS 1-9: 0
SUM OF ALL RESPONSES TO PHQ QUESTIONS 1-9: 0
10. IF YOU CHECKED OFF ANY PROBLEMS, HOW DIFFICULT HAVE THESE PROBLEMS MADE IT FOR YOU TO DO YOUR WORK, TAKE CARE OF THINGS AT HOME, OR GET ALONG WITH OTHER PEOPLE: NOT DIFFICULT AT ALL

## 2024-05-21 ASSESSMENT — PAIN SCALES - GENERAL: PAINLEVEL: NO PAIN (0)

## 2024-05-21 NOTE — PROGRESS NOTES
Preoperative Evaluation  [facility]  [address] [address]  Phone: [phone]  Primary Provider: [facility]  Pre-op Performing Provider: Chiquis Stephen DO  May 21, 2024   {Provider  Link to PREOP SmartSet  REQUIRED to apply standard patient instructions and medication directions to the AVS :094178}  {ROOMER review and update patient entered surgical information if needed :112464}  Surgical Information  Surgery/Procedure: MAMMOPLASTY, REDUCTION   Surgery Location: [facility]   Surgeon: Jett Gentile MD   Surgery Date: 6/10/24  Time of Surgery: 7:30 AM   Where patient plans to recover: At home with family  Fax number for surgical facility: Note does not need to be faxed, will be available electronically in Epic.    {Provider Charting Preference for Preop :252922}    Palmer Higgins is a 63 year old, presenting for the following:  Pre-Op Exam          5/21/2024     9:05 AM   Additional Questions   Roomed by mallory   Accompanied by self         5/21/2024     9:05 AM   Patient Reported Additional Medications   Patient reports taking the following new medications none     HPI related to upcoming procedure: ***        5/14/2024   Pre-Op Questionnaire   Have you ever had a heart attack or stroke? No   Have you ever had surgery on your heart or blood vessels, such as a stent placement, a coronary artery bypass, or surgery on an artery in your head, neck, heart, or legs? No   Do you have chest pain with activity? No   Do you have a history of heart failure? No   Do you currently have a cold, bronchitis or symptoms of other infection? No   Do you have a cough, shortness of breath, or wheezing? No   Do you or anyone in your family have previous history of blood clots? No   Do you or does anyone in your family have a serious bleeding problem such as prolonged bleeding following surgeries or cuts? No   Have you ever had problems with anemia or been  told to take iron pills? No   Have you had any abnormal blood loss such as black, tarry or bloody stools, or abnormal vaginal bleeding? No   Have you ever had a blood transfusion? No   Are you willing to have a blood transfusion if it is medically needed before, during, or after your surgery? Yes   Have you or any of your relatives ever had problems with anesthesia? No   Do you have sleep apnea, excessive snoring or daytime drowsiness? No   Do you have any artifical heart valves or other implanted medical devices like a pacemaker, defibrillator, or continuous glucose monitor? No   Do you have artificial joints? No   Are you allergic to latex? No     Health Care Directive  Patient does not have a Health Care Directive or Living Will: Advance Directive received and scanned. Click on Code in the patient header to view.    Preoperative Review of   {Mnpmpreport:457784}  {Review MNPMP for all patients per [facility] MNPMP Profile:100377}    {Chronic problem details (Optional) :027422}    Patient Active Problem List    Diagnosis Date Noted    Recurrent major depression in complete remission (H24) 06/06/2023     Priority: Medium    Essential hypertension 06/06/2023     Priority: Medium    Family history of colon cancer 06/06/2023     Priority: Medium     Father      Morbid obesity (H) 06/06/2023     Priority: Medium      Past Medical History:   Diagnosis Date    Essential hypertension 6/6/2023     Past Surgical History:   Procedure Laterality Date    COLONOSCOPY      COLONOSCOPY N/A 11/30/2023    Procedure: COLONOSCOPY, WITH POLYPECTOMY AND BIOPSY;  Surgeon: Ventura Pascal MD;  Location: [place] [facility]    GYN SURGERY      HERNIA REPAIR       Current Outpatient Medications   Medication Sig Dispense Refill    atenolol (TENORMIN) 25 MG tablet Take 0.5 tablets (12.5 mg) by mouth daily 45 tablet 3    venlafaxine (EFFEXOR) 37.5 MG tablet Take 1 tablet (37.5 mg) by mouth daily 90 tablet 3       Allergies   Allergen Reactions    Amoxicillin  "Hives, Itching and Swelling        Social History     Tobacco Use    Smoking status: Former     Current packs/day: 0.00     Types: Cigarettes    Smokeless tobacco: Never    Tobacco comments:     I smoked between the ages of 17 and 24   Substance Use Topics    Alcohol use: Yes     Comment: 5 per week     {FAMILY HISTORY (Optional):774218976}  History   Drug Use Unknown           {ROS Picklists (Optional):193283}    Objective    /80 (BP Location: Right arm, Patient Position: Sitting, Cuff Size: Adult Regular)   Pulse 67   Temp 97.9  F (36.6  C) (Tympanic)   Resp 12   Ht 1.59 m (5' 2.6\")   Wt 85.8 kg (189 lb 3.2 oz)   SpO2 97%   BMI 33.95 kg/m     Estimated body mass index is 33.95 kg/m  as calculated from the following:    Height as of this encounter: 1.59 m (5' 2.6\").    Weight as of this encounter: 85.8 kg (189 lb 3.2 oz).  "

## 2024-05-21 NOTE — PATIENT INSTRUCTIONS
How to Take Your Medication Before Surgery  Stop the vitamins/supplements 1 week prior to surgery  Take the prescription medications as normal the morning of surgery, with small sip of water  No testing needed           Patient Education   Preparing for Your Surgery  Getting started  A nurse will call you to review your health history and instructions. They will give you an arrival time based on your scheduled surgery time. Please be ready to share:  Your doctor's clinic name and phone number  Your medical, surgical, and anesthesia history  A list of allergies and sensitivities  A list of medicines, including herbal treatments and over-the-counter drugs  Whether the patient has a legal guardian (ask how to send us the papers in advance)  Please tell us if you're pregnant--or if there's any chance you might be pregnant. Some surgeries may injure a fetus (unborn baby), so they require a pregnancy test. Surgeries that are safe for a fetus don't always need a test, and you can choose whether to have one.   If you have a child who's having surgery, please ask for a copy of Preparing for Your Child's Surgery.    Preparing for surgery  Within 10 to 30 days of surgery: Have a pre-op exam (sometimes called an H&P, or History and Physical). This can be done at a clinic or pre-operative center.  If you're having a , you may not need this exam. Talk to your care team.  At your pre-op exam, talk to your care team about all medicines you take. If you need to stop any medicines before surgery, ask when to start taking them again.  We do this for your safety. Many medicines can make you bleed too much during surgery. Some change how well surgery (anesthesia) drugs work.  Call your insurance company to let them know you're having surgery. (If you don't have insurance, call [phone].)  Call your clinic if there's any change in your health. This includes signs of a cold or flu (sore throat, runny nose, cough, rash,  fever). It also includes a scrape or scratch near the surgery site.  If you have questions on the day of surgery, call your hospital or surgery center.  Eating and drinking guidelines  For your safety: Unless your surgeon tells you otherwise, follow the guidelines below.  Eat and drink as usual until 8 hours before you arrive for surgery. After that, no food or milk.  Drink clear liquids until 2 hours before you arrive. These are liquids you can see through, like water, Gatorade, and Propel Water. They also include plain black coffee and tea (no cream or milk), candy, and breath mints. You can spit out gum when you arrive.  If you drink alcohol: Stop drinking it the night before surgery.  If your care team tells you to take medicine on the morning of surgery, it's okay to take it with a sip of water.  Preventing infection  Shower or bathe the night before and morning of your surgery. Follow the instructions your clinic gave you. (If no instructions, use regular soap.)  Don't shave or clip hair near your surgery site. We'll remove the hair if needed.  Don't smoke or vape the morning of surgery. You may chew nicotine gum up to 2 hours before surgery. A nicotine patch is okay.  Note: Some surgeries require you to completely quit smoking and nicotine. Check with your surgeon.  Your care team will make every effort to keep you safe from infection. We will:  Clean our hands often with soap and water (or an alcohol-based hand rub).  Clean the skin at your surgery site with a special soap that kills germs.  Give you a special gown to keep you warm. (Cold raises the risk of infection.)  Wear special hair covers, masks, gowns and gloves during surgery.  Give antibiotic medicine, if prescribed. Not all surgeries need antibiotics.  What to bring on the day of surgery  Photo ID and insurance card  Copy of your health care directive, if you have one  Glasses and hearing aids (bring cases)  You can't wear contacts during  surgery  Inhaler and eye drops, if you use them (tell us about these when you arrive)  CPAP machine or breathing device, if you use them  A few personal items, if spending the night  If you have . . .  A pacemaker, ICD (cardiac defibrillator) or other implant: Bring the ID card.  An implanted stimulator: Bring the remote control.  A legal guardian: Bring a copy of the certified (court-stamped) guardianship papers.  Please remove any jewelry, including body piercings. Leave jewelry and other valuables at home.  If you're going home the day of surgery  You must have a responsible adult drive you home. They should stay with you overnight as well.  If you don't have someone to stay with you, and you aren't safe to go home alone, we may keep you overnight. Insurance often won't pay for this.  After surgery  If it's hard to control your pain or you need more pain medicine, please call your surgeon's office.  Questions?   If you have any questions for your care team, list them here: _________________________________________________________________________________________________________________________________________________________________________ ____________________________________ ____________________________________ ____________________________________  For informational purposes only. Not to replace the advice of your health care provider. Copyright   2003, 2019 [facility]. All rights reserved. Clinically reviewed by Anabelle Garduno MD. [place] [id number] - REV 12/22.

## 2024-05-21 NOTE — H&P (VIEW-ONLY)
Preoperative Evaluation  [facility]  [address] [place] [address]  Phone: [phone]  Primary Provider: [facility]  Pre-op Performing Provider: Chiquis Stephen,   May 21, 2024       Surgical Information  Surgery/Procedure: MAMMOPLASTY, REDUCTION   Surgery Location: [facility]  Surgeon: Jett Gentile MD   Surgery Date: 6/10/24  Time of Surgery: 7:30am  Where patient plans to recover: At home with family  Fax number for surgical facility: Note does not need to be faxed, will be available electronically in Epic.    Assessment & Plan     The proposed surgical procedure is considered INTERMEDIATE risk.    Problem List Items Addressed This Visit       Essential hypertension    Recurrent major depression in complete remission (H24)     Other Visit Diagnoses       Preop general physical exam    -  Primary    Gynecomastia                        - No identified additional risk factors other than previously addressed    Antiplatelet or Anticoagulation Medication Instructions   - Patient is on no antiplatelet or anticoagulation medications.    Additional Medication Instructions  Take all scheduled medications on the day of surgery EXCEPT for modifications listed below:   - Herbal medications and vitamins: DO NOT TAKE 14 days prior to surgery.    Recommendation  Approval given to proceed with proposed procedure, without further diagnostic evaluation.        Palmer Higgins is a 63 year old, presenting for the following:  Pre-Op Exam          5/21/2024     9:05 AM   Additional Questions   Roomed by mallory   Accompanied by self         5/21/2024     9:05 AM   Patient Reported Additional Medications   Patient reports taking the following new medications none     HPI related to upcoming procedure: bilateral breast reduction        5/14/2024   Pre-Op Questionnaire   Have you ever had a heart attack or stroke? No   Have you ever had surgery on your heart or blood  vessels, such as a stent placement, a coronary artery bypass, or surgery on an artery in your head, neck, heart, or legs? No   Do you have chest pain with activity? No   Do you have a history of heart failure? No   Do you currently have a cold, bronchitis or symptoms of other infection? No   Do you have a cough, shortness of breath, or wheezing? No   Do you or anyone in your family have previous history of blood clots? No   Do you or does anyone in your family have a serious bleeding problem such as prolonged bleeding following surgeries or cuts? No   Have you ever had problems with anemia or been told to take iron pills? No   Have you had any abnormal blood loss such as black, tarry or bloody stools, or abnormal vaginal bleeding? No   Have you ever had a blood transfusion? No   Are you willing to have a blood transfusion if it is medically needed before, during, or after your surgery? Yes   Have you or any of your relatives ever had problems with anesthesia? No   Do you have sleep apnea, excessive snoring or daytime drowsiness? No   Do you have any artifical heart valves or other implanted medical devices like a pacemaker, defibrillator, or continuous glucose monitor? No   Do you have artificial joints? No   Are you allergic to latex? No     Health Care Directive  Patient does not have a Health Care Directive or Living Will: Advance Directive received and scanned. Click on Code in the patient header to view.    Preoperative Review of    reviewed - no record of controlled substances prescribed.      Status of Chronic Conditions:  See problem list for active medical problems.  Problems all longstanding and stable, except as noted/documented.  See ROS for pertinent symptoms related to these conditions.    Patient Active Problem List    Diagnosis Date Noted    Recurrent major depression in complete remission (H24) 06/06/2023     Priority: Medium    Essential hypertension 06/06/2023     Priority: Medium    Family  "history of colon cancer 06/06/2023     Priority: Medium     Father      Morbid obesity (H) 06/06/2023     Priority: Medium      Past Medical History:   Diagnosis Date    Essential hypertension 6/6/2023     Past Surgical History:   Procedure Laterality Date    COLONOSCOPY      COLONOSCOPY N/A 11/30/2023    Procedure: COLONOSCOPY, WITH POLYPECTOMY AND BIOPSY;  Surgeon: Ventura Pascal MD;  Location: [facility]    GYN SURGERY      HERNIA REPAIR       Current Outpatient Medications   Medication Sig Dispense Refill    atenolol (TENORMIN) 25 MG tablet Take 0.5 tablets (12.5 mg) by mouth daily 45 tablet 3    venlafaxine (EFFEXOR) 37.5 MG tablet Take 1 tablet (37.5 mg) by mouth daily 90 tablet 3       Allergies   Allergen Reactions    Amoxicillin Hives, Itching and Swelling        Social History     Tobacco Use    Smoking status: Former     Current packs/day: 0.00     Types: Cigarettes    Smokeless tobacco: Never    Tobacco comments:     I smoked between the ages of 17 and 24   Substance Use Topics    Alcohol use: Yes     Comment: 5 per week     Family History   Problem Relation Age of Onset    Hypertension Mother     Hyperlipidemia Mother     Emphysema Mother     Colorectal Cancer Father     Cerebrovascular Disease Father     Colon Cancer Father     Hypertension Maternal Grandmother     Hyperlipidemia Maternal Grandmother     Cerebrovascular Disease Maternal Grandmother     Emphysema Paternal Grandfather     Hypertension Brother     Hypertension Sister     Hyperlipidemia Sister     Asthma Son      History   Drug Use Unknown             Review of Systems  Constitutional, HEENT, cardiovascular, pulmonary, gi and gu systems are negative, except as otherwise noted.    Objective    /80 (BP Location: Right arm, Patient Position: Sitting, Cuff Size: Adult Regular)   Pulse 67   Temp 97.9  F (36.6  C) (Tympanic)   Resp 12   Ht 1.59 m (5' 2.6\")   Wt 85.8 kg (189 lb 3.2 oz)   SpO2 97%   BMI 33.95 kg/m     Estimated body mass " "index is 33.95 kg/m  as calculated from the following:    Height as of this encounter: 1.59 m (5' 2.6\").    Weight as of this encounter: 85.8 kg (189 lb 3.2 oz).  Physical Exam  GENERAL: alert and no distress  EYES: Eyes grossly normal to inspection, PERRL and conjunctivae and sclerae normal  HENT: ear canals and TM's normal, nose and mouth without ulcers or lesions  NECK: no adenopathy, no asymmetry, masses, or scars  RESP: lungs clear to auscultation - no rales, rhonchi or wheezes  CV: regular rate and rhythm, normal S1 S2, no S3 or S4, no murmur, click or rub, no peripheral edema  ABDOMEN: soft, nontender, no hepatosplenomegaly, no masses and bowel sounds normal  MS: no gross musculoskeletal defects noted, no edema  SKIN: no suspicious lesions or rashes  NEURO: Normal strength and tone, mentation intact and speech normal  PSYCH: mentation appears normal, affect normal/bright    Recent Labs   Lab Test 06/06/23  1505      POTASSIUM 4.5   CR 0.82        Diagnostics  No labs were ordered during this visit.   No EKG required, no history of coronary heart disease, significant arrhythmia, peripheral arterial disease or other structural heart disease.    Revised Cardiac Risk Index (RCRI)  The patient has the following serious cardiovascular risks for perioperative complications:   - No serious cardiac risks = 0 points     RCRI Interpretation: 0 points: Class I (very low risk - 0.4% complication rate)         Signed Electronically by: Chiquis Stephen DO  Copy of this evaluation report is provided to requesting physician.         "

## 2024-05-21 NOTE — PROGRESS NOTES
Preoperative Evaluation  [facility]  [address] [address]  Phone: [phone]  Primary Provider: [facility]  Pre-op Performing Provider: Chiquis Stephen,   May 21, 2024       Surgical Information  Surgery/Procedure: MAMMOPLASTY, REDUCTION   Surgery Location: [facility]  Surgeon: Jett Gentile MD   Surgery Date: 6/10/24  Time of Surgery: 7:30am  Where patient plans to recover: At home with family  Fax number for surgical facility: Note does not need to be faxed, will be available electronically in Epic.    Assessment & Plan     The proposed surgical procedure is considered INTERMEDIATE risk.    Problem List Items Addressed This Visit       Essential hypertension    Recurrent major depression in complete remission (H24)     Other Visit Diagnoses       Preop general physical exam    -  Primary    Gynecomastia                        - No identified additional risk factors other than previously addressed    Antiplatelet or Anticoagulation Medication Instructions   - Patient is on no antiplatelet or anticoagulation medications.    Additional Medication Instructions  Take all scheduled medications on the day of surgery EXCEPT for modifications listed below:   - Herbal medications and vitamins: DO NOT TAKE 14 days prior to surgery.    Recommendation  Approval given to proceed with proposed procedure, without further diagnostic evaluation.        Palmer Higgins is a 63 year old, presenting for the following:  Pre-Op Exam          5/21/2024     9:05 AM   Additional Questions   Roomed by mallory   Accompanied by self         5/21/2024     9:05 AM   Patient Reported Additional Medications   Patient reports taking the following new medications none     HPI related to upcoming procedure: bilateral breast reduction        5/14/2024   Pre-Op Questionnaire   Have you ever had a heart attack or stroke? No   Have you ever had surgery on your heart or blood  vessels, such as a stent placement, a coronary artery bypass, or surgery on an artery in your head, neck, heart, or legs? No   Do you have chest pain with activity? No   Do you have a history of heart failure? No   Do you currently have a cold, bronchitis or symptoms of other infection? No   Do you have a cough, shortness of breath, or wheezing? No   Do you or anyone in your family have previous history of blood clots? No   Do you or does anyone in your family have a serious bleeding problem such as prolonged bleeding following surgeries or cuts? No   Have you ever had problems with anemia or been told to take iron pills? No   Have you had any abnormal blood loss such as black, tarry or bloody stools, or abnormal vaginal bleeding? No   Have you ever had a blood transfusion? No   Are you willing to have a blood transfusion if it is medically needed before, during, or after your surgery? Yes   Have you or any of your relatives ever had problems with anesthesia? No   Do you have sleep apnea, excessive snoring or daytime drowsiness? No   Do you have any artifical heart valves or other implanted medical devices like a pacemaker, defibrillator, or continuous glucose monitor? No   Do you have artificial joints? No   Are you allergic to latex? No     Health Care Directive  Patient does not have a Health Care Directive or Living Will: Advance Directive received and scanned. Click on Code in the patient header to view.    Preoperative Review of    reviewed - no record of controlled substances prescribed.      Status of Chronic Conditions:  See problem list for active medical problems.  Problems all longstanding and stable, except as noted/documented.  See ROS for pertinent symptoms related to these conditions.    Patient Active Problem List    Diagnosis Date Noted    Recurrent major depression in complete remission (H24) 06/06/2023     Priority: Medium    Essential hypertension 06/06/2023     Priority: Medium    Family  "history of colon cancer 06/06/2023     Priority: Medium     Father      Morbid obesity (H) 06/06/2023     Priority: Medium      Past Medical History:   Diagnosis Date    Essential hypertension 6/6/2023     Past Surgical History:   Procedure Laterality Date    COLONOSCOPY      COLONOSCOPY N/A 11/30/2023    Procedure: COLONOSCOPY, WITH POLYPECTOMY AND BIOPSY;  Surgeon: Ventura Pascal MD;  Location: [facility]    GYN SURGERY      HERNIA REPAIR       Current Outpatient Medications   Medication Sig Dispense Refill    atenolol (TENORMIN) 25 MG tablet Take 0.5 tablets (12.5 mg) by mouth daily 45 tablet 3    venlafaxine (EFFEXOR) 37.5 MG tablet Take 1 tablet (37.5 mg) by mouth daily 90 tablet 3       Allergies   Allergen Reactions    Amoxicillin Hives, Itching and Swelling        Social History     Tobacco Use    Smoking status: Former     Current packs/day: 0.00     Types: Cigarettes    Smokeless tobacco: Never    Tobacco comments:     I smoked between the ages of 17 and 24   Substance Use Topics    Alcohol use: Yes     Comment: 5 per week     Family History   Problem Relation Age of Onset    Hypertension Mother     Hyperlipidemia Mother     Emphysema Mother     Colorectal Cancer Father     Cerebrovascular Disease Father     Colon Cancer Father     Hypertension Maternal Grandmother     Hyperlipidemia Maternal Grandmother     Cerebrovascular Disease Maternal Grandmother     Emphysema Paternal Grandfather     Hypertension Brother     Hypertension Sister     Hyperlipidemia Sister     Asthma Son      History   Drug Use Unknown             Review of Systems  Constitutional, HEENT, cardiovascular, pulmonary, gi and gu systems are negative, except as otherwise noted.    Objective    /80 (BP Location: Right arm, Patient Position: Sitting, Cuff Size: Adult Regular)   Pulse 67   Temp 97.9  F (36.6  C) (Tympanic)   Resp 12   Ht 1.59 m (5' 2.6\")   Wt 85.8 kg (189 lb 3.2 oz)   SpO2 97%   BMI 33.95 kg/m     Estimated body mass " "index is 33.95 kg/m  as calculated from the following:    Height as of this encounter: 1.59 m (5' 2.6\").    Weight as of this encounter: 85.8 kg (189 lb 3.2 oz).  Physical Exam  GENERAL: alert and no distress  EYES: Eyes grossly normal to inspection, PERRL and conjunctivae and sclerae normal  HENT: ear canals and TM's normal, nose and mouth without ulcers or lesions  NECK: no adenopathy, no asymmetry, masses, or scars  RESP: lungs clear to auscultation - no rales, rhonchi or wheezes  CV: regular rate and rhythm, normal S1 S2, no S3 or S4, no murmur, click or rub, no peripheral edema  ABDOMEN: soft, nontender, no hepatosplenomegaly, no masses and bowel sounds normal  MS: no gross musculoskeletal defects noted, no edema  SKIN: no suspicious lesions or rashes  NEURO: Normal strength and tone, mentation intact and speech normal  PSYCH: mentation appears normal, affect normal/bright    Recent Labs   Lab Test 06/06/23  1505      POTASSIUM 4.5   CR 0.82        Diagnostics  No labs were ordered during this visit.   No EKG required, no history of coronary heart disease, significant arrhythmia, peripheral arterial disease or other structural heart disease.    Revised Cardiac Risk Index (RCRI)  The patient has the following serious cardiovascular risks for perioperative complications:   - No serious cardiac risks = 0 points     RCRI Interpretation: 0 points: Class I (very low risk - 0.4% complication rate)         Signed Electronically by: Chiquis Stephen DO  Copy of this evaluation report is provided to requesting physician.         "

## 2024-06-09 ENCOUNTER — ANESTHESIA EVENT (OUTPATIENT)
Dept: SURGERY | Facility: HOSPITAL | Age: 64
End: 2024-06-09
Payer: COMMERCIAL

## 2024-06-10 ENCOUNTER — ANESTHESIA (OUTPATIENT)
Dept: SURGERY | Facility: HOSPITAL | Age: 64
End: 2024-06-10
Payer: COMMERCIAL

## 2024-06-10 ENCOUNTER — HOSPITAL ENCOUNTER (OUTPATIENT)
Facility: HOSPITAL | Age: 64
Discharge: HOME | End: 2024-06-10
Attending: PLASTIC SURGERY | Admitting: PLASTIC SURGERY
Payer: COMMERCIAL

## 2024-06-10 VITALS
DIASTOLIC BLOOD PRESSURE: 62 MMHG | TEMPERATURE: 98 F | RESPIRATION RATE: 20 BRPM | BODY MASS INDEX: 34.34 KG/M2 | WEIGHT: 191.4 LBS | OXYGEN SATURATION: 98 % | SYSTOLIC BLOOD PRESSURE: 122 MMHG | HEART RATE: 83 BPM

## 2024-06-10 DIAGNOSIS — Z98.890 S/P BILATERAL BREAST REDUCTION: Primary | ICD-10-CM

## 2024-06-10 PROCEDURE — 250N000009 HC RX 250: Performed by: ANESTHESIOLOGY

## 2024-06-10 PROCEDURE — 250N000011 HC RX IP 250 OP 636: Mod: JZ | Performed by: ANESTHESIOLOGY

## 2024-06-10 PROCEDURE — 258N000003 HC RX IP 258 OP 636: Mod: JZ | Performed by: ANESTHESIOLOGY

## 2024-06-10 PROCEDURE — 250N000013 HC RX MED GY IP 250 OP 250 PS 637: Performed by: ANESTHESIOLOGY

## 2024-06-10 PROCEDURE — 19318 BREAST REDUCTION: CPT | Mod: 50 | Performed by: PLASTIC SURGERY

## 2024-06-10 PROCEDURE — 250N000025 HC SEVOFLURANE, PER MIN: Performed by: PLASTIC SURGERY

## 2024-06-10 PROCEDURE — 999N000141 HC STATISTIC PRE-PROCEDURE NURSING ASSESSMENT: Performed by: PLASTIC SURGERY

## 2024-06-10 PROCEDURE — 88305 TISSUE EXAM BY PATHOLOGIST: CPT | Mod: TC | Performed by: PLASTIC SURGERY

## 2024-06-10 PROCEDURE — 250N000009 HC RX 250: Performed by: PLASTIC SURGERY

## 2024-06-10 PROCEDURE — 250N000011 HC RX IP 250 OP 636: Performed by: PLASTIC SURGERY

## 2024-06-10 PROCEDURE — 710N000012 HC RECOVERY PHASE 2, PER MINUTE: Performed by: PLASTIC SURGERY

## 2024-06-10 PROCEDURE — 360N000076 HC SURGERY LEVEL 3, PER MIN: Performed by: PLASTIC SURGERY

## 2024-06-10 PROCEDURE — 272N000001 HC OR GENERAL SUPPLY STERILE: Performed by: PLASTIC SURGERY

## 2024-06-10 PROCEDURE — 88305 TISSUE EXAM BY PATHOLOGIST: CPT | Mod: 26 | Performed by: PATHOLOGY

## 2024-06-10 PROCEDURE — 250N000011 HC RX IP 250 OP 636: Performed by: NURSE ANESTHETIST, CERTIFIED REGISTERED

## 2024-06-10 PROCEDURE — 258N000003 HC RX IP 258 OP 636: Mod: JZ | Performed by: PLASTIC SURGERY

## 2024-06-10 PROCEDURE — 250N000011 HC RX IP 250 OP 636: Performed by: ANESTHESIOLOGY

## 2024-06-10 PROCEDURE — 370N000017 HC ANESTHESIA TECHNICAL FEE, PER MIN: Performed by: PLASTIC SURGERY

## 2024-06-10 PROCEDURE — 710N000009 HC RECOVERY PHASE 1, LEVEL 1, PER MIN: Performed by: PLASTIC SURGERY

## 2024-06-10 RX ORDER — FENTANYL CITRATE 50 UG/ML
25 INJECTION, SOLUTION INTRAMUSCULAR; INTRAVENOUS EVERY 5 MIN PRN
Status: DISCONTINUED | OUTPATIENT
Start: 2024-06-10 | End: 2024-06-10 | Stop reason: HOSPADM

## 2024-06-10 RX ORDER — GLYCOPYRROLATE 0.2 MG/ML
INJECTION, SOLUTION INTRAMUSCULAR; INTRAVENOUS PRN
Status: DISCONTINUED | OUTPATIENT
Start: 2024-06-10 | End: 2024-06-10

## 2024-06-10 RX ORDER — NALOXONE HYDROCHLORIDE 0.4 MG/ML
0.1 INJECTION, SOLUTION INTRAMUSCULAR; INTRAVENOUS; SUBCUTANEOUS
Status: DISCONTINUED | OUTPATIENT
Start: 2024-06-10 | End: 2024-06-10 | Stop reason: HOSPADM

## 2024-06-10 RX ORDER — ONDANSETRON 2 MG/ML
4 INJECTION INTRAMUSCULAR; INTRAVENOUS EVERY 30 MIN PRN
Status: DISCONTINUED | OUTPATIENT
Start: 2024-06-10 | End: 2024-06-10 | Stop reason: HOSPADM

## 2024-06-10 RX ORDER — MAGNESIUM SULFATE 4 G/50ML
4 INJECTION INTRAVENOUS ONCE
Status: COMPLETED | OUTPATIENT
Start: 2024-06-10 | End: 2024-06-10

## 2024-06-10 RX ORDER — PROPOFOL 10 MG/ML
INJECTION, EMULSION INTRAVENOUS CONTINUOUS PRN
Status: DISCONTINUED | OUTPATIENT
Start: 2024-06-10 | End: 2024-06-10

## 2024-06-10 RX ORDER — AMOXICILLIN 250 MG
1-2 CAPSULE ORAL 2 TIMES DAILY
Qty: 30 TABLET | Refills: 0 | Status: SHIPPED | OUTPATIENT
Start: 2024-06-10 | End: 2024-07-12

## 2024-06-10 RX ORDER — FENTANYL CITRATE 50 UG/ML
INJECTION, SOLUTION INTRAMUSCULAR; INTRAVENOUS PRN
Status: DISCONTINUED | OUTPATIENT
Start: 2024-06-10 | End: 2024-06-10

## 2024-06-10 RX ORDER — FENTANYL CITRATE 50 UG/ML
50 INJECTION, SOLUTION INTRAMUSCULAR; INTRAVENOUS EVERY 5 MIN PRN
Status: DISCONTINUED | OUTPATIENT
Start: 2024-06-10 | End: 2024-06-10 | Stop reason: HOSPADM

## 2024-06-10 RX ORDER — ACETAMINOPHEN 325 MG/1
975 TABLET ORAL ONCE
Status: COMPLETED | OUTPATIENT
Start: 2024-06-10 | End: 2024-06-10

## 2024-06-10 RX ORDER — DEXAMETHASONE SODIUM PHOSPHATE 4 MG/ML
4 INJECTION, SOLUTION INTRA-ARTICULAR; INTRALESIONAL; INTRAMUSCULAR; INTRAVENOUS; SOFT TISSUE
Status: DISCONTINUED | OUTPATIENT
Start: 2024-06-10 | End: 2024-06-10 | Stop reason: HOSPADM

## 2024-06-10 RX ORDER — ONDANSETRON 4 MG/1
4 TABLET, ORALLY DISINTEGRATING ORAL EVERY 30 MIN PRN
Status: DISCONTINUED | OUTPATIENT
Start: 2024-06-10 | End: 2024-06-10 | Stop reason: HOSPADM

## 2024-06-10 RX ORDER — SODIUM CHLORIDE, SODIUM LACTATE, POTASSIUM CHLORIDE, CALCIUM CHLORIDE 600; 310; 30; 20 MG/100ML; MG/100ML; MG/100ML; MG/100ML
INJECTION, SOLUTION INTRAVENOUS CONTINUOUS
Status: DISCONTINUED | OUTPATIENT
Start: 2024-06-10 | End: 2024-06-10 | Stop reason: HOSPADM

## 2024-06-10 RX ORDER — HYDROCODONE BITARTRATE AND ACETAMINOPHEN 5; 325 MG/1; MG/1
1-2 TABLET ORAL EVERY 4 HOURS PRN
Qty: 25 TABLET | Refills: 0 | Status: SHIPPED | OUTPATIENT
Start: 2024-06-10 | End: 2024-07-12

## 2024-06-10 RX ORDER — DEXAMETHASONE SODIUM PHOSPHATE 10 MG/ML
INJECTION, SOLUTION INTRAMUSCULAR; INTRAVENOUS PRN
Status: DISCONTINUED | OUTPATIENT
Start: 2024-06-10 | End: 2024-06-10

## 2024-06-10 RX ORDER — ONDANSETRON 2 MG/ML
INJECTION INTRAMUSCULAR; INTRAVENOUS PRN
Status: DISCONTINUED | OUTPATIENT
Start: 2024-06-10 | End: 2024-06-10

## 2024-06-10 RX ORDER — METHOCARBAMOL 750 MG/1
750 TABLET, FILM COATED ORAL ONCE
Status: COMPLETED | OUTPATIENT
Start: 2024-06-10 | End: 2024-06-10

## 2024-06-10 RX ORDER — LIDOCAINE 40 MG/G
CREAM TOPICAL
Status: DISCONTINUED | OUTPATIENT
Start: 2024-06-10 | End: 2024-06-10 | Stop reason: HOSPADM

## 2024-06-10 RX ORDER — CLINDAMYCIN HCL 300 MG
300 CAPSULE ORAL 3 TIMES DAILY
Qty: 9 CAPSULE | Refills: 0 | Status: SHIPPED | OUTPATIENT
Start: 2024-06-10 | End: 2024-08-19

## 2024-06-10 RX ORDER — EPINEPHRINE 1 MG/ML(1)
AMPUL (ML) INJECTION PRN
Status: DISCONTINUED | OUTPATIENT
Start: 2024-06-10 | End: 2024-06-10 | Stop reason: HOSPADM

## 2024-06-10 RX ORDER — OXYCODONE HYDROCHLORIDE 5 MG/1
5 TABLET ORAL
Status: COMPLETED | OUTPATIENT
Start: 2024-06-10 | End: 2024-06-10

## 2024-06-10 RX ORDER — HYDROMORPHONE HCL IN WATER/PF 6 MG/30 ML
0.2 PATIENT CONTROLLED ANALGESIA SYRINGE INTRAVENOUS EVERY 5 MIN PRN
Status: DISCONTINUED | OUTPATIENT
Start: 2024-06-10 | End: 2024-06-10 | Stop reason: HOSPADM

## 2024-06-10 RX ORDER — SODIUM CHLORIDE, SODIUM LACTATE, POTASSIUM CHLORIDE, CALCIUM CHLORIDE 600; 310; 30; 20 MG/100ML; MG/100ML; MG/100ML; MG/100ML
INJECTION, SOLUTION INTRAVENOUS CONTINUOUS PRN
Status: DISCONTINUED | OUTPATIENT
Start: 2024-06-10 | End: 2024-06-10

## 2024-06-10 RX ORDER — LIDOCAINE HYDROCHLORIDE 10 MG/ML
INJECTION, SOLUTION INFILTRATION; PERINEURAL PRN
Status: DISCONTINUED | OUTPATIENT
Start: 2024-06-10 | End: 2024-06-10

## 2024-06-10 RX ORDER — HYDROMORPHONE HCL IN WATER/PF 6 MG/30 ML
0.4 PATIENT CONTROLLED ANALGESIA SYRINGE INTRAVENOUS EVERY 5 MIN PRN
Status: DISCONTINUED | OUTPATIENT
Start: 2024-06-10 | End: 2024-06-10 | Stop reason: HOSPADM

## 2024-06-10 RX ORDER — PROPOFOL 10 MG/ML
INJECTION, EMULSION INTRAVENOUS PRN
Status: DISCONTINUED | OUTPATIENT
Start: 2024-06-10 | End: 2024-06-10

## 2024-06-10 RX ORDER — CEFAZOLIN SODIUM/WATER 2 G/20 ML
2 SYRINGE (ML) INTRAVENOUS
Status: COMPLETED | OUTPATIENT
Start: 2024-06-10 | End: 2024-06-10

## 2024-06-10 RX ORDER — ONDANSETRON 4 MG/1
4 TABLET, ORALLY DISINTEGRATING ORAL EVERY 6 HOURS PRN
Qty: 16 TABLET | Refills: 0 | Status: SHIPPED | OUTPATIENT
Start: 2024-06-10 | End: 2024-07-12

## 2024-06-10 RX ORDER — OXYCODONE HYDROCHLORIDE 5 MG/1
10 TABLET ORAL
Status: DISCONTINUED | OUTPATIENT
Start: 2024-06-10 | End: 2024-06-10 | Stop reason: HOSPADM

## 2024-06-10 RX ADMIN — SODIUM CHLORIDE, POTASSIUM CHLORIDE, SODIUM LACTATE AND CALCIUM CHLORIDE: 600; 310; 30; 20 INJECTION, SOLUTION INTRAVENOUS at 06:42

## 2024-06-10 RX ADMIN — ONDANSETRON 4 MG: 2 INJECTION INTRAMUSCULAR; INTRAVENOUS at 08:13

## 2024-06-10 RX ADMIN — PHENYLEPHRINE HYDROCHLORIDE 200 MCG: 10 INJECTION INTRAVENOUS at 08:04

## 2024-06-10 RX ADMIN — MIDAZOLAM 2 MG: 1 INJECTION INTRAMUSCULAR; INTRAVENOUS at 07:36

## 2024-06-10 RX ADMIN — PHENYLEPHRINE HYDROCHLORIDE 200 MCG: 10 INJECTION INTRAVENOUS at 08:07

## 2024-06-10 RX ADMIN — GLYCOPYRROLATE 0.1 MG: 0.2 INJECTION INTRAMUSCULAR; INTRAVENOUS at 08:43

## 2024-06-10 RX ADMIN — Medication 2 G: at 11:35

## 2024-06-10 RX ADMIN — DEXAMETHASONE SODIUM PHOSPHATE 5 MG: 10 INJECTION, SOLUTION INTRAMUSCULAR; INTRAVENOUS at 07:39

## 2024-06-10 RX ADMIN — ACETAMINOPHEN 975 MG: 325 TABLET ORAL at 05:59

## 2024-06-10 RX ADMIN — MAGNESIUM SULFATE HEPTAHYDRATE 4 G: 80 INJECTION, SOLUTION INTRAVENOUS at 06:41

## 2024-06-10 RX ADMIN — FENTANYL CITRATE 100 MCG: 50 INJECTION INTRAMUSCULAR; INTRAVENOUS at 07:39

## 2024-06-10 RX ADMIN — LIDOCAINE HYDROCHLORIDE 5 ML: 10 INJECTION, SOLUTION INFILTRATION; PERINEURAL at 07:39

## 2024-06-10 RX ADMIN — OXYCODONE HYDROCHLORIDE 5 MG: 5 TABLET ORAL at 13:25

## 2024-06-10 RX ADMIN — PROPOFOL 125 MCG/KG/MIN: 10 INJECTION, EMULSION INTRAVENOUS at 07:40

## 2024-06-10 RX ADMIN — PHENYLEPHRINE HYDROCHLORIDE 100 MCG: 10 INJECTION INTRAVENOUS at 07:50

## 2024-06-10 RX ADMIN — ONDANSETRON 4 MG: 2 INJECTION INTRAMUSCULAR; INTRAVENOUS at 10:37

## 2024-06-10 RX ADMIN — HYDROMORPHONE HYDROCHLORIDE 0.2 MG: 0.2 INJECTION, SOLUTION INTRAMUSCULAR; INTRAVENOUS; SUBCUTANEOUS at 12:29

## 2024-06-10 RX ADMIN — TRANEXAMIC ACID 1 G: 1 INJECTION, SOLUTION INTRAVENOUS at 08:14

## 2024-06-10 RX ADMIN — PROPOFOL 100 MCG/KG/MIN: 10 INJECTION, EMULSION INTRAVENOUS at 10:02

## 2024-06-10 RX ADMIN — METHOCARBAMOL 750 MG: 750 TABLET ORAL at 07:23

## 2024-06-10 RX ADMIN — Medication 80 MG: at 07:40

## 2024-06-10 RX ADMIN — PROPOFOL 50 MG: 10 INJECTION, EMULSION INTRAVENOUS at 11:19

## 2024-06-10 RX ADMIN — HYDROMORPHONE HYDROCHLORIDE 1 MG: 1 INJECTION, SOLUTION INTRAMUSCULAR; INTRAVENOUS; SUBCUTANEOUS at 08:29

## 2024-06-10 RX ADMIN — PHENYLEPHRINE HYDROCHLORIDE 100 MCG: 10 INJECTION INTRAVENOUS at 07:52

## 2024-06-10 RX ADMIN — TRANEXAMIC ACID 1 G: 1 INJECTION, SOLUTION INTRAVENOUS at 10:35

## 2024-06-10 RX ADMIN — GLYCOPYRROLATE 0.2 MG: 0.2 INJECTION INTRAMUSCULAR; INTRAVENOUS at 07:53

## 2024-06-10 RX ADMIN — SODIUM CHLORIDE, POTASSIUM CHLORIDE, SODIUM LACTATE AND CALCIUM CHLORIDE: 600; 310; 30; 20 INJECTION, SOLUTION INTRAVENOUS at 07:37

## 2024-06-10 RX ADMIN — FENTANYL CITRATE 25 MCG: 50 INJECTION, SOLUTION INTRAMUSCULAR; INTRAVENOUS at 12:08

## 2024-06-10 RX ADMIN — SODIUM CHLORIDE, POTASSIUM CHLORIDE, SODIUM LACTATE AND CALCIUM CHLORIDE: 600; 310; 30; 20 INJECTION, SOLUTION INTRAVENOUS at 08:32

## 2024-06-10 RX ADMIN — PROPOFOL 170 MG: 10 INJECTION, EMULSION INTRAVENOUS at 07:40

## 2024-06-10 RX ADMIN — Medication 2 G: at 07:32

## 2024-06-10 RX ADMIN — FENTANYL CITRATE 25 MCG: 50 INJECTION, SOLUTION INTRAMUSCULAR; INTRAVENOUS at 12:18

## 2024-06-10 ASSESSMENT — ACTIVITIES OF DAILY LIVING (ADL)
ADLS_ACUITY_SCORE: 20
ADLS_ACUITY_SCORE: 31

## 2024-06-10 NOTE — INTERVAL H&P NOTE
"I have reviewed the surgical (or preoperative) H&P that is linked to this encounter, and examined the patient. There are no significant changes    Clinical Conditions Present on Arrival:  Clinically Significant Risk Factors Present on Admission                  # Obesity: Estimated body mass index is 34.34 kg/m  as calculated from the following:    Height as of 5/21/24: 1.59 m (5' 2.6\").    Weight as of this encounter: 86.8 kg (191 lb 6.4 oz).     Jett Gentile MD , FACS   Diplomate American Board of Plastic Surgery  Diplomate American Board of Surgery  Adj. Assistant Professor of Surgery  Division of Plastic & Reconstructive Surgery   [facility]  Office: [phone]   6/10/2024 at 7:18 AM   "

## 2024-06-10 NOTE — ANESTHESIA POSTPROCEDURE EVALUATION
Patient: Kareen Helton    Procedure: Procedure(s):  MAMMOPLASTY, REDUCTION       Anesthesia Type:  General    Note:  Disposition: Outpatient   Postop Pain Control: Uneventful            Sign Out: Well controlled pain   PONV: No   Neuro/Psych: Uneventful            Sign Out: Acceptable/Baseline neuro status   Airway/Respiratory: Uneventful            Sign Out: Acceptable/Baseline resp. status   CV/Hemodynamics: Uneventful            Sign Out: Acceptable CV status; No obvious hypovolemia; No obvious fluid overload   Other NRE: NONE   DID A NON-ROUTINE EVENT OCCUR? No           Last vitals:  Vitals Value Taken Time   /72 06/10/24 1245   Temp 36.6  C (97.8  F) 06/10/24 1245   Pulse 78 06/10/24 1257   Resp 8 06/10/24 1257   SpO2 94 % 06/10/24 1257   Vitals shown include unfiled device data.    Electronically Signed By: Rick Samuels MD  Judith 10, 2024  2:09 PM

## 2024-06-10 NOTE — ANESTHESIA PREPROCEDURE EVALUATION
Anesthesia Pre-Procedure Evaluation    Patient: Kareen Helton   MRN: [id number] : 1960        Procedure : Procedure(s):  MAMMOPLASTY, REDUCTION          Past Medical History:   Diagnosis Date    Essential hypertension 2023    Gynecomastia     Obese     Recurrent major depression in complete remission (H24)       Past Surgical History:   Procedure Laterality Date    COLONOSCOPY      COLONOSCOPY N/A 2023    Procedure: COLONOSCOPY, WITH POLYPECTOMY AND BIOPSY;  Surgeon: Ventura Pascal MD;  Location: [facility]    GYN SURGERY      HERNIA REPAIR Bilateral 1966    inguinal    HYSTERECTOMY VAGINAL, SALPINGECTOMY  2013    LIPOSUCTION (LOCATION)        Allergies   Allergen Reactions    Amoxicillin Hives, Itching and Swelling      Social History     Tobacco Use    Smoking status: Former     Current packs/day: 0.00     Types: Cigarettes    Smokeless tobacco: Never    Tobacco comments:     I smoked between the ages of 17 and 24   Substance Use Topics    Alcohol use: Yes     Comment: 5 per week      Wt Readings from Last 1 Encounters:   06/10/24 86.8 kg (191 lb 6.4 oz)        Anesthesia Evaluation   Pt has had prior anesthetic.         ROS/MED HX  ENT/Pulmonary:  - neg pulmonary ROS     Neurologic:  - neg neurologic ROS     Cardiovascular:     (+)  hypertension- -   -  - -                                      METS/Exercise Tolerance:     Hematologic:  - neg hematologic  ROS     Musculoskeletal:  - neg musculoskeletal ROS     GI/Hepatic:  - neg GI/hepatic ROS     Renal/Genitourinary:  - neg Renal ROS     Endo:     (+)               Obesity,       Psychiatric/Substance Use:     (+) psychiatric history depression       Infectious Disease:  - neg infectious disease ROS     Malignancy:       Other:            Physical Exam    Airway  airway exam normal      Mallampati: II   TM distance: > 3 FB   Neck ROM: full   Mouth opening: > 3 cm    Respiratory Devices and Support         Dental       (+) Modest Abnormalities -  "crowns, retainers, 1 or 2 missing teeth      Cardiovascular   cardiovascular exam normal       Rhythm and rate: regular and normal     Pulmonary   pulmonary exam normal        breath sounds clear to auscultation           OUTSIDE LABS:  CBC: No results found for: \"WBC\", \"HGB\", \"HCT\", \"PLT\"  BMP:   Lab Results   Component Value Date     06/06/2023     02/14/2022    POTASSIUM 4.5 06/06/2023    POTASSIUM 4.1 02/14/2022    CHLORIDE 101 06/06/2023    CHLORIDE 104 02/14/2022    CO2 28 06/06/2023    CO2 33 (H) 02/14/2022    BUN 10.7 06/06/2023    BUN 13 02/14/2022    CR 0.82 06/06/2023    CR 0.85 02/14/2022    GLC 93 06/06/2023     (H) 02/14/2022     COAGS: No results found for: \"PTT\", \"INR\", \"FIBR\"  POC: No results found for: \"BGM\", \"HCG\", \"HCGS\"  HEPATIC: No results found for: \"ALBUMIN\", \"PROTTOTAL\", \"ALT\", \"AST\", \"GGT\", \"ALKPHOS\", \"BILITOTAL\", \"BILIDIRECT\", \"LIANA\"  OTHER:   Lab Results   Component Value Date    STEPHANIE 9.7 06/06/2023       Anesthesia Plan    ASA Status:  2    NPO Status:  NPO Appropriate    Anesthesia Type: General.     - Airway: ETT   Induction: Intravenous, Propofol.   Maintenance: Balanced.   Techniques and Equipment:       - Drips/Meds: Dexmed. bolus, Ketamine, Fentanyl     Consents    Anesthesia Plan(s) and associated risks, benefits, and realistic alternatives discussed. Questions answered and patient/representative(s) expressed understanding.     - Discussed:     - Discussed with:  Patient, Spouse      - Extended Intubation/Ventilatory Support Discussed: No.           Postoperative Care    Pain management: IV analgesics, Oral pain medications.   PONV prophylaxis: Ondansetron (or other 5HT-3), Dexamethasone or Solumedrol, Background Propofol Infusion     Comments:               Rick Samuels MD    I have reviewed the pertinent notes and labs in the chart from the past 30 days and (re)examined the patient.  Any updates or changes from those notes are reflected in this " "note.              # Obesity: Estimated body mass index is 34.34 kg/m  as calculated from the following:    Height as of 5/21/24: 1.59 m (5' 2.6\").    Weight as of this encounter: 86.8 kg (191 lb 6.4 oz).      "

## 2024-06-10 NOTE — ANESTHESIA CARE TRANSFER NOTE
Patient: Kareen Helton    Procedure: Procedure(s):  MAMMOPLASTY, REDUCTION       Diagnosis: Macromastia [N62]  Diagnosis Additional Information: No value filed.    Anesthesia Type:   General     Note:    Oropharynx: oropharynx clear of all foreign objects  Level of Consciousness: awake  Oxygen Supplementation: room air    Independent Airway: airway patency satisfactory and stable  Dentition: dentition unchanged  Vital Signs Stable: post-procedure vital signs reviewed and stable  Report to RN Given: handoff report given  Patient transferred to: PACU    Handoff Report: Identifed the Patient, Identified the Reponsible Provider, Reviewed the pertinent medical history, Discussed the surgical course, Reviewed Intra-OP anesthesia mangement and issues during anesthesia, Set expectations for post-procedure period and Allowed opportunity for questions and acknowledgement of understanding      Vitals:  Vitals Value Taken Time   /77 06/10/24 1145   Temp     Pulse 86 06/10/24 1146   Resp     SpO2 94 % 06/10/24 1146   Vitals shown include unfiled device data.    Electronically Signed By: VALERIA Chung CRNA  Judith 10, 2024  11:48 AM

## 2024-06-10 NOTE — ANESTHESIA PROCEDURE NOTES
Airway       Patient location during procedure: OR       Procedure Start/Stop Times: 6/10/2024 7:42 AM  Staff -        CRNA: Case Cleveland APRN CRNA       Performed By: CRNA  Consent for Airway        Urgency: elective  Indications and Patient Condition       Indications for airway management: guido-procedural       Induction type:intravenous       Mask difficulty assessment: 1 - vent by mask    Final Airway Details       Final airway type: endotracheal airway       Successful airway: ETT - single  Endotracheal Airway Details        ETT size (mm): 7.5       Cuffed: yes       Cuff volume (mL): 5       Successful intubation technique: direct laryngoscopy       DL Blade Type: MAC 3       Grade View of Cords: 1       Position: Other (comment)       Measured from: lips       Secured at (cm): 22       Bite block used: None    Post intubation assessment        Placement verified by: capnometry, equal breath sounds and chest rise        Number of attempts at approach: 1       Number of other approaches attempted: 0       Secured with: tape       Ease of procedure: easy       Dentition: Unchanged    Medication(s) Administered   Medication Administration Time: 6/10/2024 7:42 AM

## 2024-06-11 NOTE — OP NOTE
Judith 10, 2024    Kareen Helton      Preoperative diagnosis: Symptomatic bilateral macromastia.    Postoperative diagnosis: Same.    Procedure: Bilateral reduction mammoplasties.    Surgeon: Jett Gentile MD.    Assistant: Mehnaz Chatman CSA (there was no plastic surgery resident available to assist).    Anesthesia: General.    IV fluid: 1500 ml.    Tumescent solution: 400 mL (from 1000 mL of lactated [place] mixed with 1 mg of epinephrine).  200 mL was infiltrated on each breast, for a total of 400 mL.    EBL: 30 ml.    U/O: 500 ml.    Findings: Bilateral macromastia.    Specimens: Right breast parenchyma, 749 grams.  Left breast parenchyma, 655 grams.    Drains: # 15 FR Mark drain per breast.  2 drains total.    Disposition: Patient tolerated procedure well.  She was extubated and transferred to recovery room awake and in stable condition.    Indications:    Ms. Helton is a 63 year old lady who presented with symptomatic bilateral macromastia characterized by neck pain, upper back pain, bilateral shoulder grooving, occasional intertrigo as well as difficulty to asleep and to exercise secondary to the heaviness of her breasts.  She has failed nonmedical treatment with physical therapy and non steroidal anti-inflammatory medications.  Patient has been been deemed an appropriate surgical candidate for bilateral reduction mammoplasties with a Wise pattern technique and inferior pedicle bilaterally.    Risks has been explained to the patient and they include but are not limited to scarring, keloid formation, infection, wound healing problems, hematoma, temporary versus permanent altered sensation on the skin of the breasts as well as on the nipple areolar complexes, necrosis of the nipple areolar complex (NAC) with possibility of future tattooing of the NAC, hypopigmentation on the NAC, breast asymmetries, NAC asymmetries, inability to breast-feed, need for further surgeries.  Patient has acknowledged all these risks,  has signed informed consent and has agreed to proceed.    Procedure:    Patient was identified in the preoperative holding area and preoperative IV antibiotics were given to her. I then proceeded to draw the Wise pattern bilaterally for her bilateral reduction mammoplasties.     First, I localized the acromioclavicular junction on each side of her shoulders as well as the sternal notch.  Then I measured the distance between each acromioclavicular junction to the sternal notch.  The center of this distance was marked and the breast meridian was drawn on the anterior surface of each breast.  Then I proceeded to localize  Pitanguy's point on the anterior surface of each breast and then marked 2 cm superior to this point. From this point and utilizing a keyhole pattern, I draw a 6 cm line medially and laterally on each breast encompassing the NAC.  These 2 lines will join in the future in order to create the vertical limb of the superior flap of the Wise pattern.  From the inferior end of each of these 6 cm lines, I draw a transverse line both medially and laterally on each breast until they met the medial and the lateral ends of the inframammary fold (IMF).     This was the drawing of the Dickinson pattern:                   Patient was then brought to the operating room and was placed supine in the operating room table. After general anesthesia was obtained the chest and both breasts were prepped and draped in the sterile surgical fashion.    We then proceeded to design and demarcate the inferior pedicle on both breasts.  First I localized the breast meridian on the IMF. From this point I measured 4.5 cm laterally and 4.5 cm medially to the breast meridian and this created a 9 cm width for the inferior pedicle that extended superiorly along the breast and encompassing the NAC.    At this time I made a stab incision with scalpel #15 on the anterior aspect, lower third, of each inferior pedicle and proceeded to infiltrate  the breast base with tumescent solution in order to minimize bleeding and to facilitate breast parenchyma dissection.    Utilizing a 42 mm cookie cutter placed on the center of the NAC, I proceeded to make hash marks, utilizing a #15 blade, on the impression left by the cookie-cutter on the NAC.  Utilizing a #15 blade we proceeded to make hash marks along the Wise pattern markings, bilateral.    We then proceeded to de-epithelialized the inferior pedicles bilaterally.  Then, the superior flap of the Wise pattern on each breast was elevated with electrocautery.  The dissection was carried out until we reached the clavicle bilaterally.  Each flap was elevated from the fascia of the pectoralis major muscle underneath.  The fascia was left undisturbed.      At this time we proceeded to resect the skin and breast parenchyma that was medial and lateral to the inferior pedicle.    After the above-mentioned skin and breast parenchymal resections, on the right breast we removed 749 grams, and on the left breast we remove 655 grams.    Then we proceeded to provide irrigation with antibiotic solution and we found that hemostasis was excellent on both breasts.  A #15 [place] Mark drain was applied to each breast, and the drains were secured to the skin with 2-0 silk stitch.    We also applied a 2-0 silk reference stitch at the 12 o'clock position on each NAC for future reference and to avoid any twisting of the pedicle during the future inset of the NAC on the superior flap.    At this time the lateral and medial opening of the superior flap of the Wise pattern were temporarily closed with staples as a vertical limb on each breast.  The superior flap was also approximated to the IMF with temporary staples as well.  Finally, the NAC was inset on the superior flap at the level of the opening designed by the keyhole pattern with temporary staples as well.    We then proceeded to sit the patient up in order to compare symmetry on  both breasts and we were satisfied with the symmetry achieved.    The patient was then placed back in the supine position and we proceeded to close all the incisions.    Each NAC was permanently inset with 3-0 Monocryl buried interrupted stitches followed by 4-0 strata fix running subcuticular stitches around each areola.    The vertical limb of the superior flap was then closed in layers with 3-0 Monocryl buried interrupted stitches followed by 4-0 Monocryl running subcuticular stitches.  Each IMF was also closed in layers, first with 3-0 Monocryl buried interrupted stitches followed by 4-0 STRATAFIX running subcuticular stitches.    Bacitracin ointment followed by Xeroform gauze strip were applied along the incisions and the Steri-Strips around each NAC.    At the end of the case capillary refill was less than 2 seconds on each NAC.    Instrument count was reported by nursing personnel as correct.  Patient tolerated procedure well and she was extubated and transferred to recovery room awake and in stable condition.      Jett Gentile MD , FACS   Diplomate American Board of Plastic Surgery  Diplomate American Board of Surgery  Adj. Assistant Professor of Surgery  Division of Plastic & Reconstructive Surgery   [facility]  Office: [phone]   6/10/2024 at 8:00 PM

## 2024-06-14 ENCOUNTER — OFFICE VISIT (OUTPATIENT)
Dept: PLASTIC SURGERY | Facility: AMBULATORY SURGERY CENTER | Age: 64
End: 2024-06-14
Payer: COMMERCIAL

## 2024-06-14 DIAGNOSIS — Z98.890 STATUS POST BILATERAL BREAST REDUCTION: Primary | ICD-10-CM

## 2024-06-14 PROCEDURE — 99024 POSTOP FOLLOW-UP VISIT: CPT | Performed by: PLASTIC SURGERY

## 2024-06-14 NOTE — PROGRESS NOTES
Mrs. Helton is a 63 years old lady status post bilateral reduction.  She is 4 days out from surgery.  She is doing well.    At the physical exam, patient presents with excellent shape and symmetry bilaterally.  No evidence of hematoma or seroma.  Bilateral Mark drain has been removed due to low output.  Bilateral nipple areolar complexes are viable with capillary refill less than 2 seconds.    Plan: May have regular showers, start applying cocoa butter lotion over the incisions and follow-up with me in 3 weeks.  Patient is doing excellent from plastic surgery standpoint and she is happy with results.    Jett Gentile MD , FACS   Diplomate American Board of Plastic Surgery  Diplomate American Board of Surgery  Adj. Assistant Professor of Surgery  Division of Plastic & Reconstructive Surgery   [facility]  Office: [phone]   6/14/2024 at 5:19 PM

## 2024-06-14 NOTE — LETTER
6/14/2024      Kareen Helton  [id number] [address] [address]      Dear Colleague,    Thank you for referring your patient, Kareen Helton, to the [facility] [place]. Please see a copy of my visit note below.    Mrs. Helton is a 63 years old lady status post bilateral reduction.  She is 4 days out from surgery.  She is doing well.    At the physical exam, patient presents with excellent shape and symmetry bilaterally.  No evidence of hematoma or seroma.  Bilateral Mark drain has been removed due to low output.  Bilateral nipple areolar complexes are viable with capillary refill less than 2 seconds.    Plan: May have regular showers, start applying cocoa butter lotion over the incisions and follow-up with me in 3 weeks.  Patient is doing excellent from plastic surgery standpoint and she is happy with results.    Jett Gentile MD , FACS   Diplomate American Board of Plastic Surgery  Diplomate American Board of Surgery  Adj. Assistant Professor of Surgery  Division of Plastic & Reconstructive Surgery   [facility]  Office: [phone]   6/14/2024 at 5:19 PM       Again, thank you for allowing me to participate in the care of your patient.        Sincerely,        Jett Gentile MD

## 2024-06-18 NOTE — PROGRESS NOTES
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 The pain has been present for some time.  She also notes some numbness in her bilateral hands, particularly her right thumb and left ring and small fingers, but this is less bothersome to her than the right thumb base pain and she would like to focus on that today.  She has tried some thumb splinting which has helped her symptoms.    Clinical documentation by Dr. Glez on 1/26/2024 was reviewed.    6/25/2024: steroid injection was \"a miracle\". Steroid injection is still helping for the thumb CMC. She sometimes still gets numbness/tingling when sleeping or leaning on the left elbow or right wrist but this is not very bothersome for her.    Occupation: typing    Physical Examination:  There were no vitals filed for this visit.  There is no height or weight on file to calculate BMI.    Well appearing, well nourished  Alert and oriented x 3, cooperative with exam     Right Upper Extremity:  TTP CMC joint: no  TTP STT joint: no   MP hyper-extension: no   Thenar atrophy: no   Tinel's sign (volar wrist): negative   Phalen test: positive   Durkan's test: negative   Hypothenar atrophy: no   Sensory Exam:   Sensation intact to light touch in volar IF (median), volar SF (ulnar), and FDWS (radial).    Two Point Discrimination:   IF 5, SF 5  Vascular Exam:   2+ radial pulse, < 2 sec capillary refill    Left Upper Extremity:  Thenar atrophy: no   Tinel's sign (volar wrist): negative   Phalen test: negative   Durkan's test: negative   Hypothenar " atrophy: no   Tinel's sign (medial elbow): negative   Elbow flexion test: positive   Sensory Exam:   Sensation intact to light touch in volar IF (median), volar SF (ulnar), and FDWS (radial).    Two Point Discrimination:   IF 5, SF 6  Vascular Exam:   2+ radial pulse, < 2 sec capillary refill      Imaging/Studies:  XR 1 view bilateral hands on 3/19/2024: bilateral thumb CMC arthritis, right worse than left    EMG 12/13/2023 shows:  This is a mildly abnormal study, demonstrating electrophysiologic evidence of the following:  Mild right median neuropathy at the wrist.  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     Assessment: Kareen Helton is a 63 year old female with right thumb CMC arthritis, left cubital tunnel, right carpal tunnel    Plan:   I had a discussion with the patient regarding my clinical findings, diagnosis, and treatment plan. I reviewed the treatment options for basal joint arthritis (observation, bracing, steroid injection, occupational therapy, surgery, etc.) as well as the risks and benefits of each.  Her right carpal tunnel and left cubital tunnel symptoms are not very bothersome at this point so she does not want treatment for those at this time.  I do recommend elbow extension at night for the left elbow and a wrist brace at night for the right wrist, which we discussed previously.  For the thumb CMC pain, she elects observation since the steroid injection was helpful before.  If symptoms are persistent at the time of follow-up, repeat steroid injection or surgical intervention may be indicated.  The patient understands and agrees to the treatment plan.  All questions answered.      Continue comfort cool brace during the day as needed  - Right wrist in neutral position at night with brace, left elbow in extension at night    Next Visit:   Follow-up: as needed   Imaging: None   Plan: steroid injections or discuss surgery (right thumb CMC arthroplasty, right carpal tunnel, or left cubital tunnel) if  rachid MICHAEL MD

## 2024-06-25 ENCOUNTER — OFFICE VISIT (OUTPATIENT)
Dept: ORTHOPEDICS | Facility: CLINIC | Age: 64
End: 2024-06-25
Payer: COMMERCIAL

## 2024-06-25 DIAGNOSIS — M19.049 HAND ARTHRITIS: Primary | ICD-10-CM

## 2024-06-25 PROCEDURE — 99213 OFFICE O/P EST LOW 20 MIN: CPT | Performed by: STUDENT IN AN ORGANIZED HEALTH CARE EDUCATION/TRAINING PROGRAM

## 2024-06-25 NOTE — NURSING NOTE
Reason For Visit:   Chief Complaint   Patient presents with    RECHECK     Follow-up right median nerve neuropathy and left ulnar nerve neuropathy       Primary MD: Wild, [facility]  Ref. MD: Est    Age: 63 year old    ?  No      There were no vitals taken for this visit.      Pain Assessment  Patient Currently in Pain: No    Hand Dominance Evaluation  Hand Dominance: Left          QuickDASH Assessment      6/24/2024     2:56 PM   QuickDASH Main   1. Open a tight or new jar No difficulty   2. Do heavy household chores (e.g., wash walls, floors) No difficulty   3. Carry a shopping bag or briefcase No difficulty   4. Wash your back No difficulty   5. Use a knife to cut food No difficulty   6. Recreational activities in which you take some force or impact through your arm, shoulder or hand (e.g., golf, hammering, tennis, etc.) No difficulty   7. During the past week, to what extent has your arm, shoulder or hand problem interfered with your normal social activities with family, friends, neighbours or groups Not at all   8. During the past week, were you limited in your work or other regular daily activities as a result of your arm, shoulder or hand problem Not limited at all   9. Arm, shoulder or hand pain None   10.Tingling (pins and needles) in your arm,shoulder or hand Mild   11. During the past week, how much difficulty have you had sleeping because of the pain in your arm, shoulder or hand No difficulty   Quickdash Ability Score 2.27          Current Outpatient Medications   Medication Sig Dispense Refill    atenolol (TENORMIN) 25 MG tablet Take 0.5 tablets (12.5 mg) by mouth daily 45 tablet 3    Calcium Carbonate-Vitamin D (CALCIUM-D PO) Take by mouth daily      clindamycin (CLEOCIN) 300 MG capsule Take 1 capsule (300 mg) by mouth 3 times daily 9 capsule 0    Cyanocobalamin (VITAMIN B-12 PO) Take by mouth daily      famotidine (PEPCID) 20 MG tablet Take 20 mg by mouth daily       HYDROcodone-acetaminophen (NORCO) 5-325 MG tablet Take 1-2 tablets by mouth every 4 hours as needed for moderate to severe pain 25 tablet 0    ondansetron (ZOFRAN ODT) 4 MG ODT tab Take 1 tablet (4 mg) by mouth every 6 hours as needed for nausea 16 tablet 0    senna-docusate (SENOKOT-S/PERICOLACE) 8.6-50 MG tablet Take 1-2 tablets by mouth 2 times daily 30 tablet 0    venlafaxine (EFFEXOR) 37.5 MG tablet Take 1 tablet (37.5 mg) by mouth daily 90 tablet 3       Allergies   Allergen Reactions    Amoxicillin Hives, Itching and Swelling       Esha Ritchie, ATC

## 2024-06-25 NOTE — LETTER
"6/25/2024      Kare[place] Helton  [id number] [facility] [address]      Dear Colleague,    Thank you for referring your patient, Kareen Helton, to the [facility] [place]. Please see a copy of my visit note below.    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 The pain has been present for some time.  She also notes some numbness in her bilateral hands, particularly her right thumb and left ring and small fingers, but this is less bothersome to her than the right thumb base pain and she would like to focus on that today.  She has tried some thumb splinting which has helped her symptoms.    Clinical documentation by Dr. Glez on 1/26/2024 was reviewed.    6/25/2024: steroid injection was \"a miracle\". Steroid injection is still helping for the thumb CMC. She sometimes still gets numbness/tingling when sleeping or leaning on the left elbow or right wrist but this is not very bothersome for her.    Occupation: typing    Physical Examination:  There were no vitals filed for this visit.  There is no height or weight on file to calculate BMI.    Well appearing, well nourished  Alert and oriented x 3, cooperative with exam     Right Upper Extremity:  TTP CMC joint: no  TTP STT joint: no   MP hyper-extension: no   Thenar atrophy: no   Tinel's sign (volar wrist): negative   Phalen test: positive   Durkan's test: negative   Hypothenar atrophy: no   Sensory Exam:   Sensation intact to light touch in volar IF (median), volar SF (ulnar), and FDWS (radial).    Two " Point Discrimination:   IF 5, SF 5  Vascular Exam:   2+ radial pulse, < 2 sec capillary refill    Left Upper Extremity:  Thenar atrophy: no   Tinel's sign (volar wrist): negative   Phalen test: negative   Durkan's test: negative   Hypothenar atrophy: no   Tinel's sign (medial elbow): negative   Elbow flexion test: positive   Sensory Exam:   Sensation intact to light touch in volar IF (median), volar SF (ulnar), and FDWS (radial).    Two Point Discrimination:   IF 5, SF 6  Vascular Exam:   2+ radial pulse, < 2 sec capillary refill      Imaging/Studies:  XR 1 view bilateral hands on 3/19/2024: bilateral thumb CMC arthritis, right worse than left    EMG 12/13/2023 shows:  This is a mildly abnormal study, demonstrating electrophysiologic evidence of the following:  Mild right median neuropathy at the wrist.  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     Assessment: Kareen Helton is a 63 year old female with right thumb CMC arthritis, left cubital tunnel, right carpal tunnel    Plan:   I had a discussion with the patient regarding my clinical findings, diagnosis, and treatment plan. I reviewed the treatment options for basal joint arthritis (observation, bracing, steroid injection, occupational therapy, surgery, etc.) as well as the risks and benefits of each.  Her right carpal tunnel and left cubital tunnel symptoms are not very bothersome at this point so she does not want treatment for those at this time.  I do recommend elbow extension at night for the left elbow and a wrist brace at night for the right wrist, which we discussed previously.  For the thumb CMC pain, she elects observation since the steroid injection was helpful before.  If symptoms are persistent at the time of follow-up, repeat steroid injection or surgical intervention may be indicated.  The patient understands and agrees to the treatment plan.  All questions answered.      Continue comfort cool brace during the day as needed  - Right wrist in neutral  position at night with brace, left elbow in extension at night    Next Visit:   Follow-up: as needed   Imaging: None   Plan: steroid injections or discuss surgery (right thumb CMC arthroplasty, right carpal tunnel, or left cubital tunnel) if needed    EMMA MICHAEL MD

## 2024-07-12 ENCOUNTER — OFFICE VISIT (OUTPATIENT)
Dept: PLASTIC SURGERY | Facility: AMBULATORY SURGERY CENTER | Age: 64
End: 2024-07-12
Payer: COMMERCIAL

## 2024-07-12 DIAGNOSIS — Z98.890 STATUS POST BILATERAL BREAST REDUCTION: Primary | ICD-10-CM

## 2024-07-12 PROCEDURE — 99024 POSTOP FOLLOW-UP VISIT: CPT | Performed by: PLASTIC SURGERY

## 2024-07-12 NOTE — LETTER
7/12/2024      Kareen Helton  [id number] [facility] [id number]      Dear Colleague,    Thank you for referring your patient, Kareen Helton, to the [facility] [place]. Please see a copy of my visit note below.    Ms. Mathur is a 63 years old patient status post bilateral breast reduction.  She is 5 weeks out from surgery.  She is doing well.    At the physical exam, all incisions are well-healed.  No sign of hypertrophic scarring or keloid.  Excellent shape and symmetry bilaterally.  Bilateral nipple areolar complex are viable.                  Plan: May resume all normal activities, continue with cocoa butter lotion over the scars for a scar massage, continue with sports bra for another 2 weeks and follow-up as needed.  Patient is happy with results and so am I.    Jett Gentile MD , FACS   Diplomate American Board of Plastic Surgery  Diplomate American Board of Surgery  Adj. Assistant Professor of Surgery  Division of Plastic & Reconstructive Surgery   [facility]  Office: [phone]   7/12/2024 at 9:21 AM       Again, thank you for allowing me to participate in the care of your patient.        Sincerely,        Jett Gentile MD

## 2024-07-27 ENCOUNTER — HEALTH MAINTENANCE LETTER (OUTPATIENT)
Age: 64
End: 2024-07-27

## 2024-08-09 DIAGNOSIS — I10 ESSENTIAL HYPERTENSION: ICD-10-CM

## 2024-08-09 DIAGNOSIS — F33.42 RECURRENT MAJOR DEPRESSION IN COMPLETE REMISSION (H): ICD-10-CM

## 2024-08-09 RX ORDER — ATENOLOL 25 MG/1
12.5 TABLET ORAL DAILY
Qty: 45 TABLET | Refills: 0 | Status: SHIPPED | OUTPATIENT
Start: 2024-08-09 | End: 2024-08-19

## 2024-08-09 RX ORDER — VENLAFAXINE 37.5 MG/1
TABLET ORAL
Qty: 90 TABLET | Refills: 0 | Status: SHIPPED | OUTPATIENT
Start: 2024-08-09 | End: 2024-08-19

## 2024-08-14 DIAGNOSIS — F33.42 RECURRENT MAJOR DEPRESSION IN COMPLETE REMISSION (H): ICD-10-CM

## 2024-08-14 DIAGNOSIS — I10 ESSENTIAL HYPERTENSION: ICD-10-CM

## 2024-08-15 RX ORDER — ATENOLOL 25 MG/1
12.5 TABLET ORAL DAILY
Qty: 45 TABLET | Refills: 0 | OUTPATIENT
Start: 2024-08-15

## 2024-08-15 RX ORDER — VENLAFAXINE 37.5 MG/1
TABLET ORAL
Qty: 90 TABLET | Refills: 0 | OUTPATIENT
Start: 2024-08-15

## 2024-08-19 ENCOUNTER — OFFICE VISIT (OUTPATIENT)
Dept: FAMILY MEDICINE | Facility: CLINIC | Age: 64
End: 2024-08-19
Payer: COMMERCIAL

## 2024-08-19 VITALS
WEIGHT: 186.4 LBS | HEART RATE: 68 BPM | DIASTOLIC BLOOD PRESSURE: 88 MMHG | HEIGHT: 62 IN | TEMPERATURE: 97.8 F | RESPIRATION RATE: 16 BRPM | BODY MASS INDEX: 34.3 KG/M2 | SYSTOLIC BLOOD PRESSURE: 138 MMHG | OXYGEN SATURATION: 96 %

## 2024-08-19 DIAGNOSIS — I10 ESSENTIAL HYPERTENSION: ICD-10-CM

## 2024-08-19 DIAGNOSIS — Z00.00 ROUTINE PHYSICAL EXAMINATION: Primary | ICD-10-CM

## 2024-08-19 DIAGNOSIS — T63.461A ALLERGIC REACTION TO WASP STING: ICD-10-CM

## 2024-08-19 DIAGNOSIS — F33.42 RECURRENT MAJOR DEPRESSION IN COMPLETE REMISSION (H): ICD-10-CM

## 2024-08-19 PROCEDURE — 99396 PREV VISIT EST AGE 40-64: CPT | Mod: 24 | Performed by: NURSE PRACTITIONER

## 2024-08-19 PROCEDURE — 99214 OFFICE O/P EST MOD 30 MIN: CPT | Mod: 25 | Performed by: NURSE PRACTITIONER

## 2024-08-19 RX ORDER — ATENOLOL 25 MG/1
12.5 TABLET ORAL DAILY
Qty: 45 TABLET | Refills: 4 | Status: SHIPPED | OUTPATIENT
Start: 2024-08-19

## 2024-08-19 RX ORDER — PREDNISONE 20 MG/1
40 TABLET ORAL DAILY
Qty: 14 TABLET | Refills: 0 | Status: SHIPPED | OUTPATIENT
Start: 2024-08-19 | End: 2024-08-26 | Stop reason: ALTCHOICE

## 2024-08-19 RX ORDER — VENLAFAXINE 37.5 MG/1
TABLET ORAL
Qty: 90 TABLET | Refills: 3 | Status: SHIPPED | OUTPATIENT
Start: 2024-08-19

## 2024-08-19 SDOH — HEALTH STABILITY: PHYSICAL HEALTH: ON AVERAGE, HOW MANY MINUTES DO YOU ENGAGE IN EXERCISE AT THIS LEVEL?: 60 MIN

## 2024-08-19 SDOH — HEALTH STABILITY: PHYSICAL HEALTH: ON AVERAGE, HOW MANY DAYS PER WEEK DO YOU ENGAGE IN MODERATE TO STRENUOUS EXERCISE (LIKE A BRISK WALK)?: 1 DAY

## 2024-08-19 ASSESSMENT — SOCIAL DETERMINANTS OF HEALTH (SDOH): HOW OFTEN DO YOU GET TOGETHER WITH FRIENDS OR RELATIVES?: ONCE A WEEK

## 2024-08-19 NOTE — PROGRESS NOTES
"Preventive Care Visit  [facility]  VALERIA Cardona CNP, Family Medicine  Aug 19, 2024      Assessment & Plan     Routine physical examination      Essential hypertension    - atenolol (TENORMIN) 25 MG tablet; Take 0.5 tablets (12.5 mg) by mouth daily    Recurrent major depression in complete remission (H24)    - venlafaxine (EFFEXOR) 37.5 MG tablet; TAKE 1 TABLET(37.5 MG) BY MOUTH DAILY    Allergic reaction to wasp sting    - predniSONE (DELTASONE) 20 MG tablet; Take 2 tablets (40 mg) by mouth daily for 7 days      BMI  Estimated body mass index is 34.19 kg/m  as calculated from the following:    Height as of this encounter: 1.573 m (5' 1.91\").    Weight as of this encounter: 84.6 kg (186 lb 6.4 oz).   Weight management plan: Discussed healthy diet and exercise guidelines    Counseling  Appropriate preventive services were addressed with this patient via screening, questionnaire, or discussion as appropriate for fall prevention, nutrition, physical activity, Tobacco-use cessation, social engagement, weight loss and cognition.  Checklist reviewing preventive services available has been given to the patient.  Reviewed patient's diet, addressing concerns and/or questions.   She is at risk for lack of exercise and has been provided with information to increase physical activity for the benefit of her well-being.       FUTURE APPOINTMENTS:       - Follow-up for annual visit or as needed  See Patient Instructions    Palmer Higgins is a 63 year old, presenting for the following:  Physical        8/19/2024     4:00 PM   Additional Questions   Roomed by Jodi LU  Other concern-stung by bee behind right knee. Very red and painful. Now developed a rash around her neck. Rash is raised and itchy. Fingers were swollen this morning. Took benadryl which seems to be helping. Believes it to be an allergic reaction to her bite.     Hypertension Follow-up    Do you check your blood " pressure regularly outside of the clinic? No   Are you following a low salt diet? No  Are your blood pressures ever more than 140 on the top number (systolic) OR more   than 90 on the bottom number (diastolic), for example 140/90? No    Depression   How are you doing with your depression since your last visit? No change  Are you having other symptoms that might be associated with depression? No  Have you had a significant life event?  OTHER: getting      Are you feeling anxious or having panic attacks?   No  Do you have any concerns with your use of alcohol or other drugs? No    Social History     Tobacco Use    Smoking status: Former     Current packs/day: 0.00     Types: Cigarettes    Smokeless tobacco: Never    Tobacco comments:     I smoked between the ages of 17 and 24   Vaping Use    Vaping status: Never Used   Substance Use Topics    Alcohol use: Yes     Comment: 5 per week    Drug use: Never         1/9/2023     7:29 AM 10/2/2023     8:14 AM 5/21/2024     9:31 AM   PHQ   PHQ-9 Total Score 0 0 0   Q9: Thoughts of better off dead/self-harm past 2 weeks Not at all Not at all Not at all         7/25/2022     3:50 PM   DEREK-7 SCORE   Total Score 0 (minimal anxiety)   Total Score 0         Suicide Assessment Five-step Evaluation and Treatment (SAFE-T)          8/19/2024   General Health   How would you rate your overall physical health? Good   Feel stress (tense, anxious, or unable to sleep) Not at all            8/19/2024   Nutrition   Three or more servings of calcium each day? Yes   Diet: Regular (no restrictions)   How many servings of fruit and vegetables per day? (!) 2-3   How many sweetened beverages each day? 0-1            8/19/2024   Exercise   Days per week of moderate/strenous exercise 1 day   Average minutes spent exercising at this level 60 min      (!) EXERCISE CONCERN      8/19/2024   Social Factors   Frequency of gathering with friends or relatives Once a week   Worry food won't last until  get money to buy more No   Food not last or not have enough money for food? No   Do you have housing? (Housing is defined as stable permanent housing and does not include staying ouside in a car, in a tent, in an abandoned building, in an overnight shelter, or couch-surfing.) Yes   Are you worried about losing your housing? No   Lack of transportation? No   Unable to get utilities (heat,electricity)? No            8/19/2024   Fall Risk   Fallen 2 or more times in the past year? No   Trouble with walking or balance? No             8/19/2024   Dental   Dentist two times every year? Yes            8/19/2024   TB Screening   Were you born outside of the [place]? No                    8/19/2024   Substance Use   Alcohol more than 3/day or more than 7/wk No   Do you use any other substances recreationally? No        Social History     Tobacco Use    Smoking status: Former     Current packs/day: 0.00     Types: Cigarettes    Smokeless tobacco: Never    Tobacco comments:     I smoked between the ages of 17 and 24   Vaping Use    Vaping status: Never Used   Substance Use Topics    Alcohol use: Yes     Comment: 5 per week    Drug use: Never           8/14/2023   LAST FHS-7 RESULTS   1st degree relative breast or ovarian cancer No   Any relative bilateral breast cancer No   Any male have breast cancer No   Any ONE woman have BOTH breast AND ovarian cancer No   Any woman with breast cancer before 50yrs No   2 or more relatives with breast AND/OR ovarian cancer No   2 or more relatives with breast AND/OR bowel cancer No           Mammogram Screening - Mammogram every 1-2 years updated in Health Maintenance based on mutual decision making        8/19/2024   STI Screening   New sexual partner(s) since last STI/HIV test? No        History of abnormal Pap smear: No - age 30- 64 PAP with HPV every 5 years recommended        10/26/2020     9:27 AM   PAP / HPV   PAP-ABSTRACT See Scanned Document           This result is from an external  "source.     ASCVD Risk   The 10-year ASCVD risk score (Isra GARLAND, et al., 2019) is: 6%    Values used to calculate the score:      Age: 63 years      Sex: Female      Is Non- : No      Diabetic: No      Tobacco smoker: No      Systolic Blood Pressure: 138 mmHg      Is BP treated: Yes      HDL Cholesterol: 47 mg/dL      Total Cholesterol: 189 mg/dL           Reviewed and updated as needed this visit by Provider                    BP Readings from Last 3 Encounters:   08/19/24 138/88   06/10/24 122/62   05/21/24 110/80    Wt Readings from Last 3 Encounters:   08/19/24 84.6 kg (186 lb 6.4 oz)   06/10/24 86.8 kg (191 lb 6.4 oz)   05/21/24 85.8 kg (189 lb 3.2 oz)                      Review of Systems  Constitutional, HEENT, cardiovascular, pulmonary, gi and gu systems are negative, except as otherwise noted.     Objective    Exam  /88   Pulse 68   Temp 97.8  F (36.6  C) (Tympanic)   Resp 16   Ht 1.573 m (5' 1.91\")   Wt 84.6 kg (186 lb 6.4 oz)   SpO2 96%   BMI 34.19 kg/m     Estimated body mass index is 34.19 kg/m  as calculated from the following:    Height as of this encounter: 1.573 m (5' 1.91\").    Weight as of this encounter: 84.6 kg (186 lb 6.4 oz).    Physical Exam  GENERAL: alert and no distress  EYES: Eyes grossly normal to inspection and conjunctivae and sclerae normal  HENT: normal cephalic/atraumatic, ear canals and TM's normal, oropharynx clear, oral mucous membranes moist, and oropharxnx crowded  NECK: no adenopathy, no asymmetry, masses, or scars  RESP: lungs clear to auscultation - no rales, rhonchi or wheezes  CV: regular rate and rhythm, normal S1 S2, no S3 or S4, no murmur, click or rub, no peripheral edema  ABDOMEN: soft, nontender and no palpable or pulsatile masses  MS: no gross musculoskeletal defects noted, no edema  SKIN: scattered hives to neck, back and legs. Erythematous patch to posterior right knee with central punctate, no drainage, + " warmth.  NEURO: Normal strength and tone, mentation intact and speech normal  PSYCH: mentation appears normal, affect normal/bright        Signed Electronically by: VALERIA Cardona CNP

## 2024-08-26 ENCOUNTER — TELEPHONE (OUTPATIENT)
Dept: FAMILY MEDICINE | Facility: CLINIC | Age: 64
End: 2024-08-26
Payer: COMMERCIAL

## 2024-08-26 DIAGNOSIS — T63.461A ALLERGIC REACTION TO WASP STING: Primary | ICD-10-CM

## 2024-08-26 RX ORDER — PREDNISONE 20 MG/1
TABLET ORAL
Qty: 20 TABLET | Refills: 0 | Status: SHIPPED | OUTPATIENT
Start: 2024-08-26

## 2024-08-26 NOTE — TELEPHONE ENCOUNTER
Ebony saw you 8/19/24 for a physical and a bee sting reaction. She took the prednisone for 5 days and her rash and itching were better but not resolved. Now after being off prednisone for 2 days her rash is back on her face, knees and elbow and the itching is terrible. She has 2 more prednisone so she took one this morning. She is asking if she needs to be on a longer taper? She already takes daily Zyrtec and has been taking Benadryl at night for itching as well.

## (undated) DEVICE — GLOVE BIOGEL PI ULTRATOUCH G SZ 7.0 42170

## (undated) DEVICE — DRESSING XEROFORM PETROLATUM 5X9 33605

## (undated) DEVICE — PAD CHUX UNDERPAD 30X36" P3036C

## (undated) DEVICE — CUSTOM PACK GEN MAJOR SBA5BGMHEA

## (undated) DEVICE — HOLDER DRAINAGE BULB 0814-8220

## (undated) DEVICE — ENDO FORCEP ENDOJAW BIOPSY 3.7MMX230CM FB-222U

## (undated) DEVICE — DRSG KERLIX 4 1/2"X4YDS ROLL 6715

## (undated) DEVICE — GLOVE BIOGEL PI ULTRATOUCH G SZ 6.5 42165

## (undated) DEVICE — SPONGE LAP 18X18" X8435

## (undated) DEVICE — DRAIN RESERVOIR 100ML JP 0070740

## (undated) DEVICE — SU STRATAFIX MONOCRYL 4-0 SPIRAL PS-2 SXMP1B118

## (undated) DEVICE — DRAPE SHEET REV FOLD 3/4 9349

## (undated) DEVICE — GOWN IMPERVIOUS BREATHABLE SMART LG 89015

## (undated) DEVICE — BLANKET BAIR HUGGER UNDERBODY 36X84 AU63500

## (undated) DEVICE — SOL WATER IRRIG 1000ML BOTTLE 2F7114

## (undated) DEVICE — DRSG XEROFORM 1X8"

## (undated) DEVICE — DRAPE IOBAN INCISE 23X17" 6650EZ

## (undated) DEVICE — CONTAINER URINE SPEC 4OZ STRL 1053

## (undated) DEVICE — BNDG ELASTIC 6"X5YDS UNSTERILE 6611-60

## (undated) DEVICE — CATH TRAY FOLEY SURESTEP 16FR DRAIN BAG STATOCK A899916

## (undated) DEVICE — BLADE KNIFE SURG 10 371110

## (undated) DEVICE — DRAPE CHEST 100X72X124 89227

## (undated) DEVICE — SU MONOCRYL 3-0 SH 27" UND Y416H

## (undated) DEVICE — SYR BULB IRRIG DOVER 60 ML LATEX FREE 67000

## (undated) DEVICE — ESU GROUND PAD ADULT REM W/15' CORD E7507DB

## (undated) DEVICE — ESU PENCIL SMOKE EVAC W/ROCKER SWITCH 0703-047-000

## (undated) DEVICE — DRSG STERI STRIP 1/2X4" R1547

## (undated) DEVICE — GLOVE GAMMEX NEOPRENE ULTRA SZ 7.5 LF 8515

## (undated) DEVICE — SUCTION MANIFOLD NEPTUNE 2 SYS 1 PORT 702-025-000

## (undated) DEVICE — STPL SKIN 35W 6.9MM  PXW35

## (undated) DEVICE — SUCTION TIP YANKAUER W/O VENT K86

## (undated) DEVICE — DRAIN BLAKE 15FR SIL 2229

## (undated) DEVICE — MARKER SURG SKIN 2 TIP STRL SPP99DT2AA

## (undated) DEVICE — PEN MARKING SKIN W/LABELS 31145918

## (undated) DEVICE — BLADE KNIFE SURG 15 371115

## (undated) DEVICE — GLOVE BIOGEL PI INDICATOR 8.0 LF 41680

## (undated) DEVICE — DRSG DRAIN 4X4" 7086

## (undated) DEVICE — SOL NACL 0.9% IRRIG 1000ML BOTTLE 2F7124

## (undated) DEVICE — SU SILK 2-0 FS-1 18" 685G

## (undated) DEVICE — ADH LIQUID MASTISOL TOPICAL VIAL 2-3ML 0523-48

## (undated) DEVICE — SU MONOCRYL+ 4-0 18IN PS2 UND MCP496G

## (undated) DEVICE — TUBING INFUSION INFILTRATION LIPOSUCTION 156" 24-6008

## (undated) DEVICE — PREP CHLORAPREP 26ML TINTED HI-LITE ORANGE 930815

## (undated) DEVICE — GLOVE UNDER INDICATOR PI SZ 7.0 LF 41670

## (undated) DEVICE — CLEANER CAUTERY TIP E2401

## (undated) RX ORDER — PROPOFOL 10 MG/ML
INJECTION, EMULSION INTRAVENOUS
Status: DISPENSED
Start: 2023-11-30

## (undated) RX ORDER — GLYCOPYRROLATE 0.2 MG/ML
INJECTION, SOLUTION INTRAMUSCULAR; INTRAVENOUS
Status: DISPENSED
Start: 2023-11-30

## (undated) RX ORDER — PROPOFOL 10 MG/ML
INJECTION, EMULSION INTRAVENOUS
Status: DISPENSED
Start: 2024-06-10

## (undated) RX ORDER — LIDOCAINE HYDROCHLORIDE 10 MG/ML
INJECTION, SOLUTION EPIDURAL; INFILTRATION; INTRACAUDAL; PERINEURAL
Status: DISPENSED
Start: 2024-03-19

## (undated) RX ORDER — LIDOCAINE HYDROCHLORIDE 10 MG/ML
INJECTION, SOLUTION EPIDURAL; INFILTRATION; INTRACAUDAL; PERINEURAL
Status: DISPENSED
Start: 2024-06-10

## (undated) RX ORDER — CEFAZOLIN SODIUM 1 G/3ML
INJECTION, POWDER, FOR SOLUTION INTRAMUSCULAR; INTRAVENOUS
Status: DISPENSED
Start: 2024-06-10

## (undated) RX ORDER — ONDANSETRON 2 MG/ML
INJECTION INTRAMUSCULAR; INTRAVENOUS
Status: DISPENSED
Start: 2024-06-10

## (undated) RX ORDER — DEXAMETHASONE SODIUM PHOSPHATE 10 MG/ML
INJECTION, SOLUTION INTRAMUSCULAR; INTRAVENOUS
Status: DISPENSED
Start: 2024-06-10

## (undated) RX ORDER — GLYCOPYRROLATE 0.2 MG/ML
INJECTION, SOLUTION INTRAMUSCULAR; INTRAVENOUS
Status: DISPENSED
Start: 2024-06-10

## (undated) RX ORDER — GINSENG 100 MG
CAPSULE ORAL
Status: DISPENSED
Start: 2024-06-10

## (undated) RX ORDER — FENTANYL CITRATE 50 UG/ML
INJECTION, SOLUTION INTRAMUSCULAR; INTRAVENOUS
Status: DISPENSED
Start: 2024-06-10

## (undated) RX ORDER — FENTANYL CITRATE-0.9 % NACL/PF 10 MCG/ML
PLASTIC BAG, INJECTION (ML) INTRAVENOUS
Status: DISPENSED
Start: 2024-06-10